# Patient Record
Sex: MALE | Race: WHITE | NOT HISPANIC OR LATINO | Employment: OTHER | URBAN - METROPOLITAN AREA
[De-identification: names, ages, dates, MRNs, and addresses within clinical notes are randomized per-mention and may not be internally consistent; named-entity substitution may affect disease eponyms.]

---

## 2021-03-22 ENCOUNTER — OFFICE VISIT (OUTPATIENT)
Dept: URGENT CARE | Facility: PHYSICIAN GROUP | Age: 66
End: 2021-03-22
Payer: MEDICARE

## 2021-03-22 VITALS
WEIGHT: 270 LBS | SYSTOLIC BLOOD PRESSURE: 122 MMHG | DIASTOLIC BLOOD PRESSURE: 68 MMHG | BODY MASS INDEX: 38.65 KG/M2 | OXYGEN SATURATION: 91 % | TEMPERATURE: 98.4 F | HEART RATE: 72 BPM | HEIGHT: 70 IN

## 2021-03-22 DIAGNOSIS — H10.11 ACUTE ALLERGIC CONJUNCTIVITIS OF RIGHT EYE: ICD-10-CM

## 2021-03-22 DIAGNOSIS — H57.11 ACUTE PAIN IN RIGHT EYE: ICD-10-CM

## 2021-03-22 PROCEDURE — 99203 OFFICE O/P NEW LOW 30 MIN: CPT | Performed by: NURSE PRACTITIONER

## 2021-03-22 RX ORDER — KETOTIFEN FUMARATE 0.25 MG/ML
1 SOLUTION/ DROPS OPHTHALMIC 2 TIMES DAILY
Qty: 10 ML | Refills: 0 | Status: SHIPPED | OUTPATIENT
Start: 2021-03-22 | End: 2021-03-29

## 2021-03-22 ASSESSMENT — ENCOUNTER SYMPTOMS
DOUBLE VISION: 0
EYE DISCHARGE: 0
SINUS PAIN: 0
EYE PAIN: 1
HEADACHES: 0
COUGH: 0
PHOTOPHOBIA: 0
DIZZINESS: 0
SORE THROAT: 0
BLURRED VISION: 1
MYALGIAS: 0
CHILLS: 0
EYE REDNESS: 0
FEVER: 0
WEAKNESS: 0

## 2021-03-22 NOTE — PROGRESS NOTES
"Subjective:      Justin Gil is a 66 y.o. male who presents with Eye Strain (Rt eye X 1 day )            HPI  States experiencing acute right eye strain with change of vision with reading close. Having to focus more reading. States uses readers to focus near. Denies eyeball pain or discharge. Admits to rubbing eye recently. Unknown last eye exam by optometrist. Denies foreign material in eye.     PMH:  has no past medical history on file.  MEDS:   Current Outpatient Medications:   •  METFORMIN HCL PO, Take  by mouth., Disp: , Rfl:   •  LISINOPRIL PO, Take  by mouth., Disp: , Rfl:   •  ZOLPIDEM TARTRATE PO, Take  by mouth., Disp: , Rfl:   •  GLIMEPIRIDE PO, Take  by mouth., Disp: , Rfl:   •  buPROPion HCl (WELLBUTRIN PO), Take  by mouth., Disp: , Rfl:   •  ketotifen (ZADITOR) 0.025 % ophthalmic solution, Administer 1 Drop into the right eye 2 times a day for 7 days., Disp: 10 mL, Rfl: 0  ALLERGIES: No Known Allergies  SURGHX: History reviewed. No pertinent surgical history.  SOCHX:  reports that he has quit smoking. He has never used smokeless tobacco.  FH: Family history was reviewed, no pertinent findings to report    Review of Systems   Constitutional: Negative for chills, fever and malaise/fatigue.   HENT: Negative for congestion, ear pain, sinus pain and sore throat.    Eyes: Positive for blurred vision and pain. Negative for double vision, photophobia, discharge and redness.   Respiratory: Negative for cough.    Musculoskeletal: Negative for myalgias.   Skin: Negative for itching and rash.   Neurological: Negative for dizziness, weakness and headaches.   Endo/Heme/Allergies: Negative for environmental allergies.   All other systems reviewed and are negative.         Objective:     /68 (BP Location: Right arm, Patient Position: Sitting, BP Cuff Size: Large adult)   Pulse 72   Temp 36.9 °C (98.4 °F)   Ht 1.778 m (5' 10\")   Wt 122 kg (270 lb)   SpO2 91%   BMI 38.74 kg/m²      Physical Exam  Vitals " reviewed.   Constitutional:       General: He is awake. He is not in acute distress.     Appearance: Normal appearance. He is well-developed. He is not ill-appearing, toxic-appearing or diaphoretic.   HENT:      Head: Normocephalic.   Eyes:      General:         Right eye: No foreign body, discharge or hordeolum.      Extraocular Movements: Extraocular movements intact.      Conjunctiva/sclera:      Right eye: Right conjunctiva is injected. Chemosis present.      Pupils: Pupils are equal.      Right eye: Pupil is round, reactive and not sluggish.      Left eye: Pupil is round, reactive and not sluggish.        Comments: Mild lower right eyelid swelling with erythema to conjunctiva, no eye d/c.    Cardiovascular:      Rate and Rhythm: Normal rate.   Pulmonary:      Effort: Pulmonary effort is normal.   Musculoskeletal:         General: Normal range of motion.      Cervical back: Normal range of motion and neck supple.   Skin:     General: Skin is warm and dry.   Neurological:      General: No focal deficit present.      Mental Status: He is alert and oriented to person, place, and time.      GCS: GCS eye subscore is 4. GCS verbal subscore is 5. GCS motor subscore is 6.   Psychiatric:         Attention and Perception: Attention and perception normal.         Mood and Affect: Mood and affect normal.         Speech: Speech normal.         Behavior: Behavior normal. Behavior is cooperative.                 Assessment/Plan:        1. Acute pain in right eye      2. Acute allergic conjunctivitis of right eye    - ketotifen (ZADITOR) 0.025 % ophthalmic solution; Administer 1 Drop into the right eye 2 times a day for 7 days.  Dispense: 10 mL; Refill: 0    May need stronger readers for near sightedness change  May use longer acting antihistamine x 7 days for any eye itchiness  May use cool compresses for any eye swelling  Avoid touching eyes  May clean eyes with mild dilute soap along eyelash line with eyes closed, rinse with  plenty of water  May use saline eye rinse or eye irrigation solution prn for eye dryness  Monitor for increase in redness or swelling, vision change, eye pain- need re-evaluation  Recommend UTD eye exam with optometrist for possible vision change due to eye strain

## 2021-09-14 ENCOUNTER — OFFICE VISIT (OUTPATIENT)
Dept: URGENT CARE | Facility: PHYSICIAN GROUP | Age: 66
End: 2021-09-14
Payer: MEDICARE

## 2021-09-14 ENCOUNTER — APPOINTMENT (OUTPATIENT)
Dept: RADIOLOGY | Facility: IMAGING CENTER | Age: 66
End: 2021-09-14
Attending: FAMILY MEDICINE
Payer: MEDICARE

## 2021-09-14 VITALS
WEIGHT: 267 LBS | OXYGEN SATURATION: 88 % | DIASTOLIC BLOOD PRESSURE: 82 MMHG | HEART RATE: 88 BPM | HEIGHT: 70 IN | SYSTOLIC BLOOD PRESSURE: 118 MMHG | RESPIRATION RATE: 18 BRPM | BODY MASS INDEX: 38.22 KG/M2 | TEMPERATURE: 98.7 F

## 2021-09-14 DIAGNOSIS — F17.200 SMOKER: ICD-10-CM

## 2021-09-14 DIAGNOSIS — M25.532 LEFT WRIST PAIN: ICD-10-CM

## 2021-09-14 DIAGNOSIS — Z86.39 HISTORY OF DIABETES MELLITUS: ICD-10-CM

## 2021-09-14 DIAGNOSIS — R09.02 HYPOXIA: ICD-10-CM

## 2021-09-14 LAB
HBA1C MFR BLD: 10.8 % (ref 0–5.6)
INT CON NEG: ABNORMAL
INT CON POS: ABNORMAL

## 2021-09-14 PROCEDURE — 99213 OFFICE O/P EST LOW 20 MIN: CPT | Performed by: FAMILY MEDICINE

## 2021-09-14 PROCEDURE — 71046 X-RAY EXAM CHEST 2 VIEWS: CPT | Mod: TC | Performed by: FAMILY MEDICINE

## 2021-09-14 PROCEDURE — 73110 X-RAY EXAM OF WRIST: CPT | Mod: TC,LT | Performed by: FAMILY MEDICINE

## 2021-09-14 PROCEDURE — 83036 HEMOGLOBIN GLYCOSYLATED A1C: CPT | Performed by: FAMILY MEDICINE

## 2021-09-14 RX ORDER — PREDNISONE 10 MG/1
30 TABLET ORAL EVERY MORNING
Qty: 15 TABLET | Refills: 0 | Status: SHIPPED | OUTPATIENT
Start: 2021-09-14 | End: 2021-09-19

## 2021-09-15 NOTE — PROGRESS NOTES
"Subjective     Justin Gil is a 66 y.o. male who presents with Wrist Pain (left wrist injury. not sure what he did. he has a lump that has been there for years but the pain is worse since yesterday. )    - This is a pleasant and nontoxic appearing 66 y.o. male with c/o Lt wrist pain for \"awhile\" worse yesterday w/ some swelling. No specific injury or trauma.     - Smoker for years, no NVFC, no CP or SOB. Chart review shows he has had lower pulse ox in past. He is feeling well at moment and denies RODGERS or orthopnea. Has only been doing 'Virtual Visits\" w/ PCP for past year or so.       ALLERGIES:  Patient has no known allergies.     PMH:  History reviewed. No pertinent past medical history.     PSH:  History reviewed. No pertinent surgical history.    MEDS:    Current Outpatient Medications:   •  predniSONE (DELTASONE) 10 MG Tab, Take 3 Tablets by mouth every morning for 5 days., Disp: 15 Tablet, Rfl: 0  •  METFORMIN HCL PO, Take  by mouth., Disp: , Rfl:   •  LISINOPRIL PO, Take  by mouth., Disp: , Rfl:   •  ZOLPIDEM TARTRATE PO, Take  by mouth., Disp: , Rfl:   •  GLIMEPIRIDE PO, Take  by mouth., Disp: , Rfl:   •  buPROPion HCl (WELLBUTRIN PO), Take  by mouth., Disp: , Rfl:     ** I have documented what I find to be significant in regards to past medical, social, family and surgical history  in my HPI or under PMH/PSH/FH review section, otherwise it is noncontributory **             HPI    Review of Systems   Musculoskeletal: Positive for joint pain.   All other systems reviewed and are negative.             Objective     /82   Pulse 88   Temp 37.1 °C (98.7 °F) (Temporal)   Resp 18   Ht 1.778 m (5' 10\")   Wt 121 kg (267 lb)   SpO2 88%   BMI 38.31 kg/m²      Physical Exam  Vitals and nursing note reviewed.   Constitutional:       General: He is not in acute distress.     Appearance: Normal appearance. He is well-developed.   HENT:      Head: Normocephalic and atraumatic.   Eyes:      General: No scleral " icterus.  Cardiovascular:      Heart sounds: Normal heart sounds. No murmur heard.     Pulmonary:      Effort: Pulmonary effort is normal. No respiratory distress.      Breath sounds: Normal breath sounds.   Skin:     Coloration: Skin is not jaundiced or pale.   Neurological:      Mental Status: He is alert.      Motor: No abnormal muscle tone.   Psychiatric:         Mood and Affect: Mood normal.         Behavior: Behavior normal.              Assessment & Plan          1. Left wrist pain  DX-WRIST-COMPLETE 3+ LEFT    AMB REFERRAL TO ESTABLISH WITH [facility]    REFERRAL TO SPORTS MEDICINE    predniSONE (DELTASONE) 10 MG Tab   2. History of diabetes mellitus  POCT  A1C    AMB REFERRAL TO ESTABLISH WITH [facility]   3. Smoker  DX-CHEST-2 VIEWS    AMB REFERRAL TO ESTABLISH WITH [facility]    REFERRAL TO PULMONARY AND SLEEP MEDICINE   4. Hypoxia  REFERRAL TO PULMONARY AND SLEEP MEDICINE       - Dx, plan & d/c instructions discussed   - Rest, stay hydrated, OTC Motrin and/or Tylenol as needed  - E.R. precautions discussed     Asked to kindly follow up with their PCP's office in 2-3 days for a recheck, ER if not improving or feeling/getting worse.    Any realistic side effects of medications that may have been given today reviewed.     Patient left in stable condition     POCT results reviewed/discussed    Radiology imaging readings reviewed/discussed

## 2021-09-16 ENCOUNTER — OFFICE VISIT (OUTPATIENT)
Dept: MEDICAL GROUP | Facility: PHYSICIAN GROUP | Age: 66
End: 2021-09-16
Payer: MEDICARE

## 2021-09-16 VITALS
TEMPERATURE: 97.4 F | HEART RATE: 84 BPM | SYSTOLIC BLOOD PRESSURE: 136 MMHG | OXYGEN SATURATION: 91 % | BODY MASS INDEX: 38.51 KG/M2 | DIASTOLIC BLOOD PRESSURE: 78 MMHG | HEIGHT: 70 IN | WEIGHT: 269 LBS

## 2021-09-16 DIAGNOSIS — E11.59 HYPERTENSION ASSOCIATED WITH TYPE 2 DIABETES MELLITUS (HCC): ICD-10-CM

## 2021-09-16 DIAGNOSIS — M19.032 ARTHRITIS OF LEFT WRIST: ICD-10-CM

## 2021-09-16 DIAGNOSIS — E11.69 DYSLIPIDEMIA ASSOCIATED WITH TYPE 2 DIABETES MELLITUS (HCC): ICD-10-CM

## 2021-09-16 DIAGNOSIS — R05.9 COUGH: ICD-10-CM

## 2021-09-16 DIAGNOSIS — E11.65 TYPE 2 DIABETES MELLITUS WITH HYPERGLYCEMIA, WITH LONG-TERM CURRENT USE OF INSULIN (HCC): ICD-10-CM

## 2021-09-16 DIAGNOSIS — Z11.59 NEED FOR HEPATITIS C SCREENING TEST: ICD-10-CM

## 2021-09-16 DIAGNOSIS — Z12.11 ENCOUNTER FOR SCREENING FOR MALIGNANT NEOPLASM OF COLON: ICD-10-CM

## 2021-09-16 DIAGNOSIS — R06.02 SHORTNESS OF BREATH: ICD-10-CM

## 2021-09-16 DIAGNOSIS — I15.2 HYPERTENSION ASSOCIATED WITH TYPE 2 DIABETES MELLITUS (HCC): ICD-10-CM

## 2021-09-16 DIAGNOSIS — Z79.4 TYPE 2 DIABETES MELLITUS WITH HYPERGLYCEMIA, WITH LONG-TERM CURRENT USE OF INSULIN (HCC): ICD-10-CM

## 2021-09-16 DIAGNOSIS — Z87.891 FORMER CIGARETTE SMOKER: ICD-10-CM

## 2021-09-16 DIAGNOSIS — Z63.4 GRIEF AT LOSS OF CHILD: ICD-10-CM

## 2021-09-16 DIAGNOSIS — F17.290 VAPING NICOTINE DEPENDENCE, TOBACCO PRODUCT: ICD-10-CM

## 2021-09-16 DIAGNOSIS — E11.42 DIABETIC POLYNEUROPATHY ASSOCIATED WITH TYPE 2 DIABETES MELLITUS (HCC): ICD-10-CM

## 2021-09-16 DIAGNOSIS — E78.5 DYSLIPIDEMIA ASSOCIATED WITH TYPE 2 DIABETES MELLITUS (HCC): ICD-10-CM

## 2021-09-16 DIAGNOSIS — F43.21 GRIEF AT LOSS OF CHILD: ICD-10-CM

## 2021-09-16 PROBLEM — J40 BRONCHITIS: Status: ACTIVE | Noted: 2017-03-01

## 2021-09-16 PROCEDURE — 99214 OFFICE O/P EST MOD 30 MIN: CPT | Performed by: STUDENT IN AN ORGANIZED HEALTH CARE EDUCATION/TRAINING PROGRAM

## 2021-09-16 RX ORDER — GABAPENTIN 800 MG/1
800 TABLET ORAL 3 TIMES DAILY
COMMUNITY
End: 2022-12-16 | Stop reason: SDUPTHER

## 2021-09-16 RX ORDER — GABAPENTIN 400 MG/1
400 CAPSULE ORAL 3 TIMES DAILY
COMMUNITY
End: 2022-10-24

## 2021-09-16 RX ORDER — BUPROPION HYDROCHLORIDE 150 MG/1
150 TABLET ORAL DAILY
COMMUNITY
End: 2022-10-24

## 2021-09-16 RX ORDER — BUPROPION HYDROCHLORIDE 300 MG/1
300 TABLET ORAL DAILY
COMMUNITY
Start: 2021-09-10 | End: 2023-05-19

## 2021-09-16 RX ORDER — INSULIN GLARGINE 100 [IU]/ML
70 INJECTION, SOLUTION SUBCUTANEOUS EVERY EVENING
COMMUNITY
End: 2022-08-02 | Stop reason: SDUPTHER

## 2021-09-16 RX ORDER — ZOLPIDEM TARTRATE 10 MG/1
10 TABLET ORAL
COMMUNITY
Start: 2021-09-10 | End: 2022-02-17

## 2021-09-16 SDOH — SOCIAL STABILITY - SOCIAL INSECURITY: DISSAPEARANCE AND DEATH OF FAMILY MEMBER: Z63.4

## 2021-09-16 ASSESSMENT — PATIENT HEALTH QUESTIONNAIRE - PHQ9
CLINICAL INTERPRETATION OF PHQ2 SCORE: 6
SUM OF ALL RESPONSES TO PHQ QUESTIONS 1-9: 13
5. POOR APPETITE OR OVEREATING: 1 - SEVERAL DAYS

## 2021-09-16 NOTE — PATIENT INSTRUCTIONS
Ideal fasting glucose <130 and random <180 (2 hours after eating). Check twice a day. Can increase lantus by 2 units every 3rd day, can adjust down as well.    Voltaren gel is ok to try for the wrist.    Semaglutide injection solution  What is this medicine?  SEMAGLUTIDE (Margaret a GLOO tide) is used to improve blood sugar control in adults with type 2 diabetes. This medicine may be used with other diabetes medicines. This drug may also reduce the risk of heart attack or stroke if you have type 2 diabetes and risk factors for heart disease.  This medicine may be used for other purposes; ask your health care provider or pharmacist if you have questions.  COMMON BRAND NAME(S): OZEMPIC  What should I tell my health care provider before I take this medicine?  They need to know if you have any of these conditions:  · endocrine tumors (MEN 2) or if someone in your family had these tumors  · eye disease, vision problems  · history of pancreatitis  · kidney disease  · stomach problems  · thyroid cancer or if someone in your family had thyroid cancer  · an unusual or allergic reaction to semaglutide, other medicines, foods, dyes, or preservatives  · pregnant or trying to get pregnant  · breast-feeding  How should I use this medicine?  This medicine is for injection under the skin of your upper leg (thigh), stomach area, or upper arm. It is given once every week (every 7 days). You will be taught how to prepare and give this medicine. Use exactly as directed. Take your medicine at regular intervals. Do not take it more often than directed.  If you use this medicine with insulin, you should inject this medicine and the insulin separately. Do not mix them together. Do not give the injections right next to each other. Change (rotate) injection sites with each injection.  It is important that you put your used needles and syringes in a special sharps container. Do not put them in a trash can. If you do not have a sharps container,  call your pharmacist or healthcare provider to get one.  A special MedGuide will be given to you by the pharmacist with each prescription and refill. Be sure to read this information carefully each time.  Talk to your pediatrician regarding the use of this medicine in children. Special care may be needed.  Overdosage: If you think you have taken too much of this medicine contact a poison control center or emergency room at once.  NOTE: This medicine is only for you. Do not share this medicine with others.  What if I miss a dose?  If you miss a dose, take it as soon as you can within 5 days after the missed dose. Then take your next dose at your regular weekly time. If it has been longer than 5 days after the missed dose, do not take the missed dose. Take the next dose at your regular time. Do not take double or extra doses. If you have questions about a missed dose, contact your health care provider for advice.  What may interact with this medicine?  · other medicines for diabetes  Many medications may cause changes in blood sugar, these include:  · alcohol containing beverages  · antiviral medicines for HIV or AIDS  · aspirin and aspirin-like drugs  · certain medicines for blood pressure, heart disease, irregular heart beat  · chromium  · diuretics  · female hormones, such as estrogens or progestins, birth control pills  · fenofibrate  · gemfibrozil  · isoniazid  · lanreotide  · male hormones or anabolic steroids  · MAOIs like Carbex, Eldepryl, Marplan, Nardil, and Parnate  · medicines for weight loss  · medicines for allergies, asthma, cold, or cough  · medicines for depression, anxiety, or psychotic disturbances  · niacin  · nicotine  · NSAIDs, medicines for pain and inflammation, like ibuprofen or naproxen  · octreotide  · pasireotide  · pentamidine  · phenytoin  · probenecid  · quinolone antibiotics such as ciprofloxacin, levofloxacin, ofloxacin  · some herbal dietary supplements  · steroid medicines such as  prednisone or cortisone  · sulfamethoxazole; trimethoprim  · thyroid hormones  Some medications can hide the warning symptoms of low blood sugar (hypoglycemia). You may need to monitor your blood sugar more closely if you are taking one of these medications. These include:  · beta-blockers, often used for high blood pressure or heart problems (examples include atenolol, metoprolol, propranolol)  · clonidine  · guanethidine  · reserpine  This list may not describe all possible interactions. Give your health care provider a list of all the medicines, herbs, non-prescription drugs, or dietary supplements you use. Also tell them if you smoke, drink alcohol, or use illegal drugs. Some items may interact with your medicine.  What should I watch for while using this medicine?  Visit your doctor or health care professional for regular checks on your progress.  Drink plenty of fluids while taking this medicine. Check with your doctor or health care professional if you get an attack of severe diarrhea, nausea, and vomiting. The loss of too much body fluid can make it dangerous for you to take this medicine.  A test called the HbA1C (A1C) will be monitored. This is a simple blood test. It measures your blood sugar control over the last 2 to 3 months. You will receive this test every 3 to 6 months.  Learn how to check your blood sugar. Learn the symptoms of low and high blood sugar and how to manage them.  Always carry a quick-source of sugar with you in case you have symptoms of low blood sugar. Examples include hard sugar candy or glucose tablets. Make sure others know that you can choke if you eat or drink when you develop serious symptoms of low blood sugar, such as seizures or unconsciousness. They must get medical help at once.  Tell your doctor or health care professional if you have high blood sugar. You might need to change the dose of your medicine. If you are sick or exercising more than usual, you might need to  change the dose of your medicine.  Do not skip meals. Ask your doctor or health care professional if you should avoid alcohol. Many nonprescription cough and cold products contain sugar or alcohol. These can affect blood sugar.  Pens should never be shared. Even if the needle is changed, sharing may result in passing of viruses like hepatitis or HIV.  Wear a medical ID bracelet or chain, and carry a card that describes your disease and details of your medicine and dosage times.  Do not become pregnant while taking this medicine. Women should inform their doctor if they wish to become pregnant or think they might be pregnant. There is a potential for serious side effects to an unborn child. Talk to your health care professional or pharmacist for more information.  What side effects may I notice from receiving this medicine?  Side effects that you should report to your doctor or health care professional as soon as possible:  · allergic reactions like skin rash, itching or hives, swelling of the face, lips, or tongue  · breathing problems  · changes in vision  · diarrhea that continues or is severe  · lump or swelling on the neck  · severe nausea  · signs and symptoms of infection like fever or chills; cough; sore throat; pain or trouble passing urine  · signs and symptoms of low blood sugar such as feeling anxious, confusion, dizziness, increased hunger, unusually weak or tired, sweating, shakiness, cold, irritable, headache, blurred vision, fast heartbeat, loss of consciousness  · signs and symptoms of kidney injury like trouble passing urine or change in the amount of urine  · trouble swallowing  · unusual stomach upset or pain  · vomiting  Side effects that usually do not require medical attention (report to your doctor or health care professional if they continue or are bothersome):  · constipation  · diarrhea  · nausea  · pain, redness, or irritation at site where injected  · stomach upset  This list may not  describe all possible side effects. Call your doctor for medical advice about side effects. You may report side effects to FDA at 6-196-LYD-6700.  Where should I keep my medicine?  Keep out of the reach of children.  Store unopened pens in a refrigerator between 2 and 8 degrees C (36 and 46 degrees F). Do not freeze. Protect from light and heat. After you first use the pen, it can be stored for 56 days at room temperature between 15 and 30 degrees C (59 and 86 degrees F) or in a refrigerator. Throw away your used pen after 56 days or after the expiration date, whichever comes first.  Do not store your pen with the needle attached. If the needle is left on, medicine may leak from the pen.  NOTE: This sheet is a summary. It may not cover all possible information. If you have questions about this medicine, talk to your doctor, pharmacist, or health care provider.  © 2020 [employer] (2020-01-17 14:25:32)

## 2021-09-16 NOTE — PROGRESS NOTES
Subjective:     CC: Establish care    HISTORY OF THE PRESENT ILLNESS: Patient is a 66 y.o. male. This pleasant patient is here today to establish care and discuss wrist pain. His/her prior PCP was Dr. Pendleton.    Patient reports he recently went to the urgent care for left wrist pain with swelling, was seen on the 14th.  Provided with prednisone which seems to be helping the pain as well as the edema.  Denies any fever or chills.  Was given a referral to sports medicine.    Grief at loss of child  Hx of bipolar per patient prior. Son killed himself 3 months ago (drug addict).     Type 2 diabetes mellitus with hyperglycemia, with long-term current use of insulin ([facility])  This is a chronic condition.  He denies any unintended weight loss, polydipsia or polyuria.  Reports chronic numbness and tingling in his feet bilaterally.  Current medications: Lantus 64 units nightly, Metformin 1000 mg twice daily, glimepiride 2 mg daily.  Last A1c: Checked during urgent care visit 10.8 9/20121  Last Microalb/Cr ratio: Due  Fasting sugars: Not checking  Last diabetic foot exam: Plan for follow-up  Last retinal eye exam: Due      Hypertension associated with type 2 diabetes mellitus (HCC)  This is a chronic condition.  Patient taking lisinopril 5 mg daily.  Not checking blood pressure at home.    Shortness of breath  Patient has been reporting shortness of breath when it was prior just with some exertion as well as acute on chronic cough.  Denies any chest pain.  No hemoptysis.  Was a prior cigarette smoker, switch that out when he quit for vaping which she understands he should likely discontinue. Quit 7 years ago, smoked for 30 years 2ppd.    Current Outpatient Medications Ordered in Epic   Medication Sig Dispense Refill   • sertraline (ZOLOFT) 50 MG Tab TAKE 2 TABLETS(100 MG) BY MOUTH DAILY     • buPROPion (WELLBUTRIN XL) 300 MG XL tablet Take 300 mg by mouth every day.     • zolpidem (AMBIEN) 10 MG Tab Take 10 mg by mouth.     •  "gabapentin (NEURONTIN) 800 MG tablet Take 800 mg by mouth 3 times a day.     • gabapentin (NEURONTIN) 400 MG Cap Take 400 mg by mouth 3 times a day.     • buPROPion (WELLBUTRIN XL) 150 MG XL tablet Take 150 mg by mouth every day.     • insulin glargine (LANTUS SOLOSTAR) 100 UNIT/ML Solution Pen-injector injection Inject 64 Units under the skin every evening.     • Semaglutide,0.25 or 0.5MG/DOS, 2 MG/1.5ML Solution Pen-injector Inject 0.25 mg under the skin every 7 days. 1.5 mL 1   • predniSONE (DELTASONE) 10 MG Tab Take 3 Tablets by mouth every morning for 5 days. 15 Tablet 0   • METFORMIN HCL PO Take 1,000 mg by mouth 2 (two) times a day.     • LISINOPRIL PO Take 5 mg by mouth every day.     • ZOLPIDEM TARTRATE PO Take 10 mg by mouth at bedtime as needed.     • GLIMEPIRIDE PO Take 2 mg by mouth every day.       No current [facility]-ordered facility-administered medications on file.     ROS:   See HPI      Objective:     Exam: /78 (BP Location: Left arm, Patient Position: Sitting, BP Cuff Size: Adult long)   Pulse 84   Temp 36.3 °C (97.4 °F) (Temporal)   Ht 1.778 m (5' 10\")   Wt 122 kg (269 lb)   SpO2 91%  Body mass index is 38.6 kg/m².    General: Well developed, well nourished in no acute distress.  Head: Normocephalic and atraumatic.  Eyes: Conjunctivae and extraocular motions are normal. Pupils are equal, round. No scleral icterus.   Ears:  External ears unremarkable.  Nose: Nares patent. No discharge.  Mouth/Throat: Oropharynx is clear and moist. Posterior pharynx without erythema or exudates.  Neck: Supple. Thyroid is not enlarged.  Pulmonary: Clear to ausculation, but slightly diminished in the bases.  Normal effort. No rales, ronchi, or wheezing.  Cardiovascular: Regular rate and rhythm without murmur.  Abdomen: Soft, nontender, nondistended. Normal bowel sounds. No HSM.  Neurologic: Normal gait.   Lymph: No cervical or supraclavicular lymph nodes are palpable  Skin: Warm and dry.  No obvious " lesions.  Musculoskeletal: No extremity cyanosis, clubbing, or edema.  Psych: Normal mood and affect. Alert and oriented x3. Judgment and insight is normal.    Labs: A1c reviewed from 9/14/2021  Imaging: Left wrist x-ray 9/14/2021  IMPRESSION:  1.  No radiographic evidence of acute fracture or dislocation.  2.  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  3.  Soft tissue swelling overlying the dorsal aspect of the left wrist.    Assessment & Plan:   66 y.o. male with the following -    1. Arthritis of left wrist  Chronic arthritis with acute pain, improved with prednisone.  Okay to try Tylenol or ibuprofen as well for pain.  Has a referral to sports medicine, otto to trial Voltaren gel for his wrist.    2. Grief at loss of child  This is an acute condition.  Patient reports he has a history of bipolar prior.  Recent depression and grief related to some suicide 3 months ago.  Would benefit from psychiatry evaluation and treatment with medication management.  Continue current dose of Wellbutrin and Zoloft, no safety concerns.  Patient seeking grief counseling, agree.  - REFERRAL TO PSYCHIATRY    3. Cough  4. Shortness of breath  5. Former cigarette smoker  6. Vaping nicotine dependence, tobacco product  Pulse ox was low at recent urgent care visit, acceptable today.  Reviewed chest x-ray.  He has a referral to pulmonary sleep medicine but we will go ahead and obtain pulmonary function test and CT chest (does not meet criteria for routine lung cancer screening at this time given symptoms).  Check CBC to ensure he does not have anemia.  Strongly encouraged him to stop vaping. Gave f/u and ED precautions.   - PULMONARY FUNCTION TESTS -Test requested: Complete Pulmonary Function Test; Future  - CT-CHEST (THORAX) W/O; Future  - CBC WITH DIFFERENTIAL; Future    7. Type 2 diabetes mellitus with hyperglycemia, with long-term current use of  insulin (HCC)  This is a chronic condition, improved from prior but still uncontrolled.  Denies any history of pancreatitis or family history of thyroid cancers, we will add Ozempic as below, trend A1c in 3 months.  Continue Metformin 1000 mg daily, glimepiride 2 mg daily, Lantus 64 units QHS-can increase by 2 units every third day to adjust to goal fasting glucose less than 130 and random less than 180.  Overdue for labs as below.  - Comp Metabolic Panel; Future  - MICROALBUMIN CREAT RATIO URINE; Future  - Semaglutide,0.25 or 0.5MG/DOS, 2 MG/1.5ML Solution Pen-injector; Inject 0.25 mg under the skin every 7 days.  Dispense: 1.5 mL; Refill: 1    8. Diabetic polyneuropathy associated with type 2 diabetes mellitus (HCC)  Chronic, will work on improving diabetes as above.  Plan for monofilament next visit.  Continue gabapentin.    9. Dyslipidemia associated with type 2 diabetes mellitus (HCC)  This is a chronic condition-not on medication, this was listed as a prior diagnosis but I do not see recent labs in the chart currently, will update.  Likely would benefit from a statin.  - Lipid Profile; Future    10. Hypertension associated with type 2 diabetes mellitus (HCC)  This is a chronic condition, overall well controlled but ideally would be best less than 130/80.  Continue lisinopril 5 mg for now.    11. Encounter for screening for malignant neoplasm of colon  - REFERRAL TO GI FOR COLONOSCOPY    12. Need for hepatitis C screening test  - HEP C VIRUS ANTIBODY; Future    Return in about 4 weeks (around 10/14/2021) for Diabetes.    Please note that this dictation was created using voice recognition software. I have made every reasonable attempt to correct obvious errors, but I expect that there are errors of grammar and possibly content that I did not discover before finalizing the note.

## 2021-09-17 PROBLEM — E11.59 HYPERTENSION ASSOCIATED WITH TYPE 2 DIABETES MELLITUS (HCC): Status: RESOLVED | Noted: 2021-09-17 | Resolved: 2021-09-17

## 2021-09-17 PROBLEM — E11.59 HYPERTENSION ASSOCIATED WITH TYPE 2 DIABETES MELLITUS (HCC): Status: ACTIVE | Noted: 2021-09-17

## 2021-09-17 PROBLEM — I15.2 HYPERTENSION ASSOCIATED WITH TYPE 2 DIABETES MELLITUS (HCC): Status: RESOLVED | Noted: 2021-09-17 | Resolved: 2021-09-17

## 2021-09-17 PROBLEM — I15.2 HYPERTENSION ASSOCIATED WITH TYPE 2 DIABETES MELLITUS (HCC): Status: ACTIVE | Noted: 2021-09-17

## 2021-09-18 NOTE — ASSESSMENT & PLAN NOTE
This is a chronic condition.  Patient taking lisinopril 5 mg daily.  Not checking blood pressure at home.

## 2021-09-18 NOTE — ASSESSMENT & PLAN NOTE
This is a chronic condition.  He denies any unintended weight loss, polydipsia or polyuria.  Reports chronic numbness and tingling in his feet bilaterally.  Current medications: Lantus 64 units nightly, Metformin 1000 mg twice daily, glimepiride 2 mg daily.  Last A1c: Checked during urgent care visit 10.8 9/20121  Last Microalb/Cr ratio: Due  Fasting sugars: Not checking  Last diabetic foot exam: Plan for follow-up  Last retinal eye exam: Due

## 2021-09-18 NOTE — ASSESSMENT & PLAN NOTE
Patient has been reporting shortness of breath when it was prior just with some exertion as well as acute on chronic cough.  Denies any chest pain.  No hemoptysis.  Was a prior cigarette smoker, switch that out when he quit for vaping which she understands he should likely discontinue. Quit 7 years ago, smoked for 30 years 2ppd.

## 2021-09-20 ENCOUNTER — TELEPHONE (OUTPATIENT)
Dept: MEDICAL GROUP | Facility: PHYSICIAN GROUP | Age: 66
End: 2021-09-20

## 2021-09-20 ENCOUNTER — HOSPITAL ENCOUNTER (OUTPATIENT)
Dept: LAB | Facility: MEDICAL CENTER | Age: 66
End: 2021-09-20
Attending: STUDENT IN AN ORGANIZED HEALTH CARE EDUCATION/TRAINING PROGRAM
Payer: MEDICARE

## 2021-09-20 DIAGNOSIS — E78.5 DYSLIPIDEMIA ASSOCIATED WITH TYPE 2 DIABETES MELLITUS (HCC): ICD-10-CM

## 2021-09-20 DIAGNOSIS — E11.69 DYSLIPIDEMIA ASSOCIATED WITH TYPE 2 DIABETES MELLITUS (HCC): ICD-10-CM

## 2021-09-20 DIAGNOSIS — E11.65 TYPE 2 DIABETES MELLITUS WITH HYPERGLYCEMIA, WITH LONG-TERM CURRENT USE OF INSULIN (HCC): ICD-10-CM

## 2021-09-20 DIAGNOSIS — Z11.59 NEED FOR HEPATITIS C SCREENING TEST: ICD-10-CM

## 2021-09-20 DIAGNOSIS — R06.02 SHORTNESS OF BREATH: ICD-10-CM

## 2021-09-20 DIAGNOSIS — Z79.4 TYPE 2 DIABETES MELLITUS WITH HYPERGLYCEMIA, WITH LONG-TERM CURRENT USE OF INSULIN (HCC): ICD-10-CM

## 2021-09-20 LAB
ALBUMIN SERPL BCP-MCNC: 4 G/DL (ref 3.2–4.9)
ALBUMIN/GLOB SERPL: 1.4 G/DL
ALP SERPL-CCNC: 112 U/L (ref 30–99)
ALT SERPL-CCNC: 42 U/L (ref 2–50)
ANION GAP SERPL CALC-SCNC: 13 MMOL/L (ref 7–16)
AST SERPL-CCNC: 26 U/L (ref 12–45)
BASOPHILS # BLD AUTO: 1 % (ref 0–1.8)
BASOPHILS # BLD: 0.11 K/UL (ref 0–0.12)
BILIRUB SERPL-MCNC: 0.3 MG/DL (ref 0.1–1.5)
BUN SERPL-MCNC: 21 MG/DL (ref 8–22)
CALCIUM SERPL-MCNC: 9.2 MG/DL (ref 8.5–10.5)
CHLORIDE SERPL-SCNC: 99 MMOL/L (ref 96–112)
CHOLEST SERPL-MCNC: 226 MG/DL (ref 100–199)
CO2 SERPL-SCNC: 22 MMOL/L (ref 20–33)
CREAT SERPL-MCNC: 0.81 MG/DL (ref 0.5–1.4)
EOSINOPHIL # BLD AUTO: 0.19 K/UL (ref 0–0.51)
EOSINOPHIL NFR BLD: 1.7 % (ref 0–6.9)
ERYTHROCYTE [DISTWIDTH] IN BLOOD BY AUTOMATED COUNT: 47.1 FL (ref 35.9–50)
GLOBULIN SER CALC-MCNC: 2.9 G/DL (ref 1.9–3.5)
GLUCOSE SERPL-MCNC: 233 MG/DL (ref 65–99)
HCT VFR BLD AUTO: 45.1 % (ref 42–52)
HCV AB SER QL: NORMAL
HDLC SERPL-MCNC: 40 MG/DL
HGB BLD-MCNC: 15 G/DL (ref 14–18)
IMM GRANULOCYTES # BLD AUTO: 0.08 K/UL (ref 0–0.11)
IMM GRANULOCYTES NFR BLD AUTO: 0.7 % (ref 0–0.9)
LDLC SERPL CALC-MCNC: ABNORMAL MG/DL
LYMPHOCYTES # BLD AUTO: 3.25 K/UL (ref 1–4.8)
LYMPHOCYTES NFR BLD: 28.5 % (ref 22–41)
MCH RBC QN AUTO: 30.4 PG (ref 27–33)
MCHC RBC AUTO-ENTMCNC: 33.3 G/DL (ref 33.7–35.3)
MCV RBC AUTO: 91.3 FL (ref 81.4–97.8)
MONOCYTES # BLD AUTO: 1 K/UL (ref 0–0.85)
MONOCYTES NFR BLD AUTO: 8.8 % (ref 0–13.4)
NEUTROPHILS # BLD AUTO: 6.77 K/UL (ref 1.82–7.42)
NEUTROPHILS NFR BLD: 59.3 % (ref 44–72)
NRBC # BLD AUTO: 0 K/UL
NRBC BLD-RTO: 0 /100 WBC
PLATELET # BLD AUTO: 239 K/UL (ref 164–446)
PMV BLD AUTO: 9.3 FL (ref 9–12.9)
POTASSIUM SERPL-SCNC: 3.8 MMOL/L (ref 3.6–5.5)
PROT SERPL-MCNC: 6.9 G/DL (ref 6–8.2)
RBC # BLD AUTO: 4.94 M/UL (ref 4.7–6.1)
SODIUM SERPL-SCNC: 134 MMOL/L (ref 135–145)
TRIGL SERPL-MCNC: 637 MG/DL (ref 0–149)
WBC # BLD AUTO: 11.4 K/UL (ref 4.8–10.8)

## 2021-09-20 PROCEDURE — 36415 COLL VENOUS BLD VENIPUNCTURE: CPT

## 2021-09-20 PROCEDURE — 80061 LIPID PANEL: CPT

## 2021-09-20 PROCEDURE — 80053 COMPREHEN METABOLIC PANEL: CPT

## 2021-09-20 PROCEDURE — 86803 HEPATITIS C AB TEST: CPT

## 2021-09-20 PROCEDURE — 85025 COMPLETE CBC W/AUTO DIFF WBC: CPT

## 2021-09-20 NOTE — TELEPHONE ENCOUNTER
Pt called and left VM stating that he can not afford to  the Ozempic as it is over $400. Can a alternative medication be sent to pharmacy?

## 2021-09-21 ENCOUNTER — HOSPITAL ENCOUNTER (OUTPATIENT)
Facility: MEDICAL CENTER | Age: 66
End: 2021-09-21
Attending: STUDENT IN AN ORGANIZED HEALTH CARE EDUCATION/TRAINING PROGRAM
Payer: MEDICARE

## 2021-09-21 ENCOUNTER — NON-PROVIDER VISIT (OUTPATIENT)
Dept: SLEEP MEDICINE | Facility: MEDICAL CENTER | Age: 66
End: 2021-09-21
Attending: STUDENT IN AN ORGANIZED HEALTH CARE EDUCATION/TRAINING PROGRAM
Payer: MEDICARE

## 2021-09-21 VITALS — WEIGHT: 264 LBS | HEIGHT: 67 IN | BODY MASS INDEX: 41.44 KG/M2

## 2021-09-21 DIAGNOSIS — F17.290 VAPING NICOTINE DEPENDENCE, TOBACCO PRODUCT: ICD-10-CM

## 2021-09-21 DIAGNOSIS — R05.9 COUGH: ICD-10-CM

## 2021-09-21 DIAGNOSIS — Z87.891 FORMER CIGARETTE SMOKER: ICD-10-CM

## 2021-09-21 DIAGNOSIS — R06.02 SHORTNESS OF BREATH: ICD-10-CM

## 2021-09-21 LAB
CREAT UR-MCNC: 45.6 MG/DL
MICROALBUMIN UR-MCNC: <1.2 MG/DL
MICROALBUMIN/CREAT UR: NORMAL MG/G (ref 0–30)

## 2021-09-21 PROCEDURE — 82043 UR ALBUMIN QUANTITATIVE: CPT

## 2021-09-21 PROCEDURE — 94729 DIFFUSING CAPACITY: CPT | Performed by: INTERNAL MEDICINE

## 2021-09-21 PROCEDURE — 82570 ASSAY OF URINE CREATININE: CPT

## 2021-09-21 PROCEDURE — 94060 EVALUATION OF WHEEZING: CPT | Performed by: INTERNAL MEDICINE

## 2021-09-21 PROCEDURE — 94726 PLETHYSMOGRAPHY LUNG VOLUMES: CPT | Performed by: INTERNAL MEDICINE

## 2021-09-21 ASSESSMENT — PULMONARY FUNCTION TESTS
FEV1/FVC: 77.13
FEV1: 2.26
FVC_PREDICTED: 3.96
FVC_LLN: 3.31
FVC_PERCENT_PREDICTED: 72
FEV1/FVC_PERCENT_PREDICTED: 77
FEV1/FVC: 74
FVC_PERCENT_PREDICTED: 73
FEV1_PERCENT_PREDICTED: 74
FVC: 2.93
FEV1/FVC_PERCENT_CHANGE: 4
FEV1/FVC: 77
FVC: 2.88
FEV1/FVC_PERCENT_PREDICTED: 101
FEV1_PREDICTED: 3.05
FEV1/FVC_PREDICTED: 77
FEV1/FVC_PERCENT_PREDICTED: 95
FEV1/FVC_PERCENT_PREDICTED: 100
FEV1/FVC_PERCENT_CHANGE: 600
FEV1/FVC: 74
FEV1/FVC_PERCENT_LLN: 64
FEV1_LLN: 2.55
FEV1_PERCENT_CHANGE: 6
FEV1: 2.12
FEV1_PERCENT_PREDICTED: 69
FEV1_PERCENT_CHANGE: 1
FEV1/FVC_PERCENT_PREDICTED: 96

## 2021-09-21 ASSESSMENT — FIBROSIS 4 INDEX: FIB4 SCORE: 1.11

## 2021-11-30 ENCOUNTER — HOSPITAL ENCOUNTER (OUTPATIENT)
Facility: MEDICAL CENTER | Age: 66
End: 2021-11-30
Attending: FAMILY MEDICINE
Payer: MEDICARE

## 2021-11-30 ENCOUNTER — OFFICE VISIT (OUTPATIENT)
Dept: URGENT CARE | Facility: PHYSICIAN GROUP | Age: 66
End: 2021-11-30
Payer: MEDICARE

## 2021-11-30 VITALS
DIASTOLIC BLOOD PRESSURE: 56 MMHG | HEART RATE: 77 BPM | SYSTOLIC BLOOD PRESSURE: 124 MMHG | WEIGHT: 260 LBS | HEIGHT: 67 IN | BODY MASS INDEX: 40.81 KG/M2 | TEMPERATURE: 98.3 F | OXYGEN SATURATION: 92 % | RESPIRATION RATE: 20 BRPM

## 2021-11-30 DIAGNOSIS — J32.9 VIRAL SINUSITIS: ICD-10-CM

## 2021-11-30 DIAGNOSIS — B97.89 VIRAL SINUSITIS: ICD-10-CM

## 2021-11-30 PROCEDURE — U0005 INFEC AGEN DETEC AMPLI PROBE: HCPCS

## 2021-11-30 PROCEDURE — U0003 INFECTIOUS AGENT DETECTION BY NUCLEIC ACID (DNA OR RNA); SEVERE ACUTE RESPIRATORY SYNDROME CORONAVIRUS 2 (SARS-COV-2) (CORONAVIRUS DISEASE [COVID-19]), AMPLIFIED PROBE TECHNIQUE, MAKING USE OF HIGH THROUGHPUT TECHNOLOGIES AS DESCRIBED BY CMS-2020-01-R: HCPCS

## 2021-11-30 PROCEDURE — 99213 OFFICE O/P EST LOW 20 MIN: CPT | Performed by: FAMILY MEDICINE

## 2021-11-30 RX ORDER — FLUTICASONE PROPIONATE 50 MCG
1 SPRAY, SUSPENSION (ML) NASAL 2 TIMES DAILY
Qty: 16 G | Refills: 0 | Status: SHIPPED | OUTPATIENT
Start: 2021-11-30 | End: 2021-12-07

## 2021-11-30 RX ORDER — LISINOPRIL 5 MG/1
5 TABLET ORAL DAILY
COMMUNITY
Start: 2021-10-02 | End: 2022-04-29 | Stop reason: SDUPTHER

## 2021-11-30 RX ORDER — LORATADINE 10 MG
2 CAPSULE ORAL 2 TIMES DAILY
Qty: 1.2 ML | Refills: 0 | Status: SHIPPED | OUTPATIENT
Start: 2021-11-30 | End: 2021-12-03

## 2021-11-30 ASSESSMENT — FIBROSIS 4 INDEX: FIB4 SCORE: 1.11

## 2021-12-01 DIAGNOSIS — J32.9 VIRAL SINUSITIS: ICD-10-CM

## 2021-12-01 DIAGNOSIS — B97.89 VIRAL SINUSITIS: ICD-10-CM

## 2021-12-01 LAB — COVID ORDER STATUS COVID19: NORMAL

## 2021-12-01 NOTE — PROGRESS NOTES
CC:  cough        Cough  This is a new problem. The current episode started 2 days ago. The problem has been unchanged. The problem occurs constantly. The cough is productive. Associated symptoms include : sinus pain, nasal congestion.    He denies:  muscle aches, fever. Pertinent negatives include no      nausea, vomiting, diarrhea, sweats, weight loss or wheezing. Nothing aggravates the symptoms.  Patient has tried nothing for the symptoms. There is no history of asthma.        Past Medical History:   Diagnosis Date   • PNA (pneumonia)     with septic shock per patient         Social History     Tobacco Use   • Smoking status: Former Smoker     Packs/day: 2.00     Years: 60.00     Pack years: 120.00     Types: Cigarettes     Quit date: 2014     Years since quittin.2   • Smokeless tobacco: Never Used   Vaping Use   • Vaping Use: Every day   • Substances: Nicotine   Substance Use Topics   • Alcohol use: Never   • Drug use: Never         Current Outpatient Medications on File Prior to Visit   Medication Sig Dispense Refill   • sertraline (ZOLOFT) 50 MG Tab TAKE 2 TABLETS(100 MG) BY MOUTH DAILY     • buPROPion (WELLBUTRIN XL) 300 MG XL tablet Take 300 mg by mouth every day.     • zolpidem (AMBIEN) 10 MG Tab Take 10 mg by mouth.     • gabapentin (NEURONTIN) 800 MG tablet Take 800 mg by mouth 3 times a day.     • gabapentin (NEURONTIN) 400 MG Cap Take 400 mg by mouth 3 times a day.     • buPROPion (WELLBUTRIN XL) 150 MG XL tablet Take 150 mg by mouth every day.     • insulin glargine (LANTUS SOLOSTAR) 100 UNIT/ML Solution Pen-injector injection Inject 64 Units under the skin every evening.     • Semaglutide,0.25 or 0.5MG/DOS, 2 MG/1.5ML Solution Pen-injector Inject 0.25 mg under the skin every 7 days. 1.5 mL 1   • METFORMIN HCL PO Take 1,000 mg by mouth 2 (two) times a day.     • LISINOPRIL PO Take 5 mg by mouth every day.     • ZOLPIDEM TARTRATE PO Take 10 mg by mouth at bedtime as needed.     •  "GLIMEPIRIDE PO Take 2 mg by mouth every day.       No current facility-administered medications on file prior to visit.                    Review of Systems   Constitutional: Negative for fever and weight loss.   HENT: negative for otalgia  Cardiovascular - denies chest pain or dyspnea  Respiratory: Positive for cough.  .  Negative for wheezing.    Neurological: Negative for headaches.   GI - denies nausea, vomiting or diarrhea  Neuro - denies numbness or tingling.            Objective:     /56 (BP Location: Left arm, Patient Position: Sitting, BP Cuff Size: Adult)   Pulse 77   Temp 36.8 °C (98.3 °F) (Temporal)   Resp 20   Ht 1.702 m (5' 7\")   Wt 118 kg (260 lb)   SpO2 92%     Physical Exam   Constitutional: patient is oriented to person, place, and time. Patient appears well-developed and well-nourished. No distress.   HENT:   Head: Normocephalic and atraumatic.   Right Ear: External ear normal.   Left Ear: External ear normal.   Nose: Mucosal edema  present.  +  bilat sinus  tenderness.   Mouth/Throat: Mucous membranes are normal. No oral lesions.  No posterior pharyngeal erythema.  No oropharyngeal exudate or posterior oropharyngeal edema.   Eyes: Conjunctivae and EOM are normal. Pupils are equal, round, and reactive to light. Right eye exhibits no discharge. Left eye exhibits no discharge. No scleral icterus.   Neck: Normal range of motion. Neck supple. No tracheal deviation present.   Cardiovascular: Normal rate, regular rhythm and normal heart sounds.  Exam reveals no friction rub.    Pulmonary/Chest: Effort normal. No respiratory distress. Patient has no wheezes or rhonchi. Patient has no rales.    Musculoskeletal:  exhibits no edema.   Lymphadenopathy:     Patient has no cervical adenopathy.      Neurological: patient is alert and oriented to person, place, and time.   Skin: Skin is warm and dry. No rash noted. No erythema.   Psychiatric: patient  has a normal mood and affect.  behavior is " normal.   Nursing note and vitals reviewed.              Assessment/Plan:           1. Viral sinusitis   rx flonase  Bid    COVID screen sent       Follow up in one week if no improvement, sooner if symptoms worsen.

## 2021-12-02 LAB
SARS-COV-2 RNA RESP QL NAA+PROBE: NOTDETECTED
SPECIMEN SOURCE: NORMAL

## 2022-01-03 ENCOUNTER — OFFICE VISIT (OUTPATIENT)
Dept: URGENT CARE | Facility: PHYSICIAN GROUP | Age: 67
End: 2022-01-03
Payer: MEDICARE

## 2022-01-03 VITALS
OXYGEN SATURATION: 90 % | TEMPERATURE: 98 F | HEART RATE: 83 BPM | BODY MASS INDEX: 40.16 KG/M2 | SYSTOLIC BLOOD PRESSURE: 130 MMHG | DIASTOLIC BLOOD PRESSURE: 68 MMHG | WEIGHT: 265 LBS | HEIGHT: 68 IN | RESPIRATION RATE: 16 BRPM
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
## 2022-01-03 DIAGNOSIS — S39.92XA INJURY OF BACK, INITIAL ENCOUNTER: ICD-10-CM

## 2022-01-03 PROCEDURE — 99213 OFFICE O/P EST LOW 20 MIN: CPT | Performed by: FAMILY MEDICINE

## 2022-01-03 ASSESSMENT — FIBROSIS 4 INDEX: FIB4 SCORE: 1.12

## 2022-01-03 NOTE — PROGRESS NOTES
"Subjective     Justin Gil is a 67 y.o. male who presents with Back Pain (x3days fell on back, now having back pain )  Pain shoots down the legs.  Denies any loss of bladder bowel control.  Denies paresthesia.  No other injuries.  This happened while he was in the bathroom and slipped and fell backwards and there was a small luggage in the bathroom and hit his back on that.  No LOC.  Review of system otherwise negative        HPI    ROS           Objective     /68 (BP Location: Right arm, Patient Position: Sitting, BP Cuff Size: Adult)   Pulse 83   Temp 36.7 °C (98 °F) (Temporal)   Resp 16   Ht 1.727 m (5' 8\")   Wt 120 kg (265 lb)   SpO2 90%   BMI 40.29 kg/m²      Physical Exam  Constitutional:       General: He is not in acute distress.     Appearance: He is not ill-appearing, toxic-appearing or diaphoretic.   Eyes:      Conjunctiva/sclera: Conjunctivae normal.   Cardiovascular:      Rate and Rhythm: Normal rate.   Pulmonary:      Effort: Pulmonary effort is normal. No respiratory distress.      Breath sounds: No stridor.   Musculoskeletal:      Thoracic back: Bony tenderness present. No swelling, deformity, signs of trauma or lacerations.      Lumbar back: Bony tenderness (In the outlined area) present. No swelling, edema, deformity, signs of trauma or lacerations.        Back:    Skin:     Coloration: Skin is not jaundiced or pale.   Neurological:      Mental Status: He is alert and oriented to person, place, and time.      Comments: Grossly benign   Psychiatric:         Behavior: Behavior normal.             Assessment & Plan   ASSESSMENT:PLAN:  1. Injury of back, initial encounter    2. Fall, initial encounter    Fall and back injury and tenderness in the midline as mentioned above.  Does not have any neurologic deficit.  This happened 3 days ago.  We do not even have an x-ray but he actually needs CT.  Sent to local ED for evaluation.             "

## 2022-01-16 ENCOUNTER — OFFICE VISIT (OUTPATIENT)
Dept: URGENT CARE | Facility: PHYSICIAN GROUP | Age: 67
End: 2022-01-16
Payer: MEDICARE

## 2022-01-16 VITALS
HEART RATE: 89 BPM | OXYGEN SATURATION: 90 % | RESPIRATION RATE: 20 BRPM | SYSTOLIC BLOOD PRESSURE: 134 MMHG | WEIGHT: 271.8 LBS | BODY MASS INDEX: 42.66 KG/M2 | TEMPERATURE: 98.5 F | HEIGHT: 67 IN | DIASTOLIC BLOOD PRESSURE: 78 MMHG

## 2022-01-16 DIAGNOSIS — S39.012A STRAIN OF LUMBAR REGION, INITIAL ENCOUNTER: ICD-10-CM

## 2022-01-16 PROCEDURE — 99213 OFFICE O/P EST LOW 20 MIN: CPT | Performed by: FAMILY MEDICINE

## 2022-01-16 RX ORDER — CYCLOBENZAPRINE HCL 10 MG
5 TABLET ORAL 2 TIMES DAILY PRN
Qty: 10 TABLET | Refills: 0 | Status: SHIPPED | OUTPATIENT
Start: 2022-01-16 | End: 2022-02-17 | Stop reason: SDUPTHER

## 2022-01-16 RX ORDER — LISINOPRIL 5 MG/1
1 TABLET ORAL DAILY
COMMUNITY
Start: 2021-12-17 | End: 2022-01-16

## 2022-01-16 ASSESSMENT — FIBROSIS 4 INDEX: FIB4 SCORE: 1.12

## 2022-01-16 NOTE — PROGRESS NOTES
"  Subjective:      67 y.o. male presents to urgent care for lower back pain that started about 2 weeks ago. Prior to the pain he had a ground level fall. He did present to  after the fall, but was sent to ED for imaging which returned normal. Unfortunately he continues to have pain without any change over the last two weeks, it is located in the middle, lower back, it is intermittent, it improves with rest, it's described as sharp, currently rated 0/10. He has been using Tylenol, Ibuprofen, and Prednisone burst, all of which provide temporary relief. No numbness or weakness to lower extremities bilaterally. No loss of bowel or bladder function.     He denies any other questions or concerns at this time.    Current problem list, medication, and past medical/surgical history were reviewed in Epic.    ROS  See HPI     Objective:      /78 (BP Location: Left arm, Patient Position: Sitting, BP Cuff Size: Adult)   Pulse 89   Temp 36.9 °C (98.5 °F) (Temporal)   Resp 20   Ht 1.702 m (5' 7\")   Wt 123 kg (271 lb 12.8 oz)   SpO2 90%   BMI 42.57 kg/m²     Physical Exam  Constitutional:       General: He is not in acute distress.     Appearance: He is not diaphoretic.   Cardiovascular:      Rate and Rhythm: Normal rate and regular rhythm.      Heart sounds: Normal heart sounds.   Pulmonary:      Effort: Pulmonary effort is normal. No respiratory distress.      Breath sounds: Normal breath sounds.   Musculoskeletal:      Comments: No discolorations or deformities noted to inspection of his back.  No step-offs or areas of tenderness noted to palpation of spine.  He is tender to paraspinal region bilaterally in the lumbar area.   Neurological:      Mental Status: He is alert.      Comments: Equal strength and sensation to lower extremities bilaterally.   Psychiatric:         Mood and Affect: Affect normal.         Judgment: Judgment normal.       Assessment/Plan:     1. Strain of lumbar region, initial " encounter  Patient was given muscle relaxants to use as needed.  He was encouraged to continue with heat, Tylenol and ibuprofen as needed for symptomatic relief.  Exercise handout has been given.  - cyclobenzaprine (FLEXERIL) 10 mg Tab; Take 0.5 Tablets by mouth 2 times a day as needed for Moderate Pain or Muscle Spasms.  Dispense: 10 Tablet; Refill: 0      Instructed to return to Urgent Care or nearest Emergency Department if symptoms fail to improve, for any change in condition, further concerns, or new concerning symptoms. Patient states understanding of the plan of care and discharge instructions.    Joellen Weston M.D.

## 2022-02-09 DIAGNOSIS — M48.061 SPINAL STENOSIS OF LUMBAR REGION, UNSPECIFIED WHETHER NEUROGENIC CLAUDICATION PRESENT: ICD-10-CM

## 2022-02-09 DIAGNOSIS — M54.16 LUMBAR NERVE ROOT IMPINGEMENT: ICD-10-CM

## 2022-02-09 NOTE — PROGRESS NOTES
Received a phone call from [facility] emergency department on [facility] regarding this patient's fourth ER visit for low back pain.  He had some benefit from prednisone 3 weeks ago.  ER provider feels that he may benefit from a lumbar MRI and referral to pain management as he has had some improvement with injections in the past.  ER provider states that he has not been able to get in to be seen, I do not see that he has any upcoming appointments and was supposed to follow-up in October of last year.    ER provider reviewed CT results 1/19/2022: L45 canal steonisis and bilat nerve root impingement.  We will plan for MRI and referral to physiatry for consideration of injections as that has been helpful in the past, patient not interested in surgery.  Discussed with ER provider that patient was due to follow-up in October but has been lost to follow-up so encouraged a appointment.  Will request records from ER.

## 2022-02-17 ENCOUNTER — OFFICE VISIT (OUTPATIENT)
Dept: MEDICAL GROUP | Facility: PHYSICIAN GROUP | Age: 67
End: 2022-02-17
Payer: MEDICARE

## 2022-02-17 VITALS
TEMPERATURE: 98.9 F | SYSTOLIC BLOOD PRESSURE: 122 MMHG | WEIGHT: 273 LBS | HEART RATE: 96 BPM | OXYGEN SATURATION: 91 % | DIASTOLIC BLOOD PRESSURE: 78 MMHG | BODY MASS INDEX: 42.85 KG/M2 | HEIGHT: 67 IN

## 2022-02-17 DIAGNOSIS — L84 CALLUS OF FOOT: ICD-10-CM

## 2022-02-17 DIAGNOSIS — I15.2 HYPERTENSION ASSOCIATED WITH TYPE 2 DIABETES MELLITUS (HCC): ICD-10-CM

## 2022-02-17 DIAGNOSIS — Z79.4 TYPE 2 DIABETES MELLITUS WITH HYPERGLYCEMIA, WITH LONG-TERM CURRENT USE OF INSULIN (HCC): ICD-10-CM

## 2022-02-17 DIAGNOSIS — M54.16 LUMBAR NERVE ROOT IMPINGEMENT: ICD-10-CM

## 2022-02-17 DIAGNOSIS — Z59.9 FINANCIAL DIFFICULTY: ICD-10-CM

## 2022-02-17 DIAGNOSIS — E11.65 TYPE 2 DIABETES MELLITUS WITH HYPERGLYCEMIA, WITH LONG-TERM CURRENT USE OF INSULIN (HCC): ICD-10-CM

## 2022-02-17 DIAGNOSIS — M54.41 CHRONIC BILATERAL LOW BACK PAIN WITH BILATERAL SCIATICA: ICD-10-CM

## 2022-02-17 DIAGNOSIS — E11.59 HYPERTENSION ASSOCIATED WITH TYPE 2 DIABETES MELLITUS (HCC): ICD-10-CM

## 2022-02-17 DIAGNOSIS — M54.42 CHRONIC BILATERAL LOW BACK PAIN WITH BILATERAL SCIATICA: ICD-10-CM

## 2022-02-17 DIAGNOSIS — G89.29 CHRONIC BILATERAL LOW BACK PAIN WITH BILATERAL SCIATICA: ICD-10-CM

## 2022-02-17 DIAGNOSIS — M21.612 BILATERAL BUNIONS: ICD-10-CM

## 2022-02-17 DIAGNOSIS — M48.062 SPINAL STENOSIS OF LUMBAR REGION WITH NEUROGENIC CLAUDICATION: ICD-10-CM

## 2022-02-17 DIAGNOSIS — E11.42 DIABETIC POLYNEUROPATHY ASSOCIATED WITH TYPE 2 DIABETES MELLITUS (HCC): ICD-10-CM

## 2022-02-17 DIAGNOSIS — L60.9 NAIL ABNORMALITIES: ICD-10-CM

## 2022-02-17 DIAGNOSIS — M21.611 BILATERAL BUNIONS: ICD-10-CM

## 2022-02-17 PROBLEM — E66.01 CLASS 3 SEVERE OBESITY WITH SERIOUS COMORBIDITY AND BODY MASS INDEX (BMI) OF 40.0 TO 44.9 IN ADULT (HCC): Status: ACTIVE | Noted: 2022-02-17

## 2022-02-17 LAB
HBA1C MFR BLD: 11.5 % (ref 0–5.6)
INT CON NEG: ABNORMAL
INT CON POS: ABNORMAL

## 2022-02-17 PROCEDURE — 83036 HEMOGLOBIN GLYCOSYLATED A1C: CPT | Performed by: STUDENT IN AN ORGANIZED HEALTH CARE EDUCATION/TRAINING PROGRAM

## 2022-02-17 PROCEDURE — 99215 OFFICE O/P EST HI 40 MIN: CPT | Performed by: STUDENT IN AN ORGANIZED HEALTH CARE EDUCATION/TRAINING PROGRAM

## 2022-02-17 RX ORDER — KETOROLAC TROMETHAMINE 30 MG/ML
30 INJECTION, SOLUTION INTRAMUSCULAR; INTRAVENOUS ONCE
Status: COMPLETED | OUTPATIENT
Start: 2022-02-17 | End: 2022-02-17

## 2022-02-17 RX ORDER — PREDNISONE 20 MG/1
TABLET ORAL
COMMUNITY
Start: 2022-01-05 | End: 2022-02-17

## 2022-02-17 RX ORDER — METHOCARBAMOL 750 MG/1
TABLET, FILM COATED ORAL
COMMUNITY
Start: 2022-01-19 | End: 2022-02-17

## 2022-02-17 RX ORDER — PREDNISONE 50 MG/1
TABLET ORAL
COMMUNITY
Start: 2022-02-09 | End: 2022-02-17

## 2022-02-17 RX ORDER — GLIMEPIRIDE 2 MG/1
4 TABLET ORAL EVERY MORNING
COMMUNITY
Start: 2022-02-01 | End: 2022-12-15

## 2022-02-17 RX ORDER — INSULIN LISPRO 100 [IU]/ML
INJECTION, SOLUTION INTRAVENOUS; SUBCUTANEOUS
COMMUNITY
Start: 2022-02-01 | End: 2022-04-21 | Stop reason: SDUPTHER

## 2022-02-17 RX ORDER — MELOXICAM 7.5 MG/1
7.5-15 TABLET ORAL DAILY
Qty: 30 TABLET | Refills: 0 | Status: SHIPPED | OUTPATIENT
Start: 2022-02-17 | End: 2022-03-29 | Stop reason: SDUPTHER

## 2022-02-17 RX ORDER — CYCLOBENZAPRINE HCL 10 MG
5-10 TABLET ORAL 3 TIMES DAILY PRN
Qty: 30 TABLET | Refills: 1 | Status: SHIPPED | OUTPATIENT
Start: 2022-02-17 | End: 2022-03-24 | Stop reason: SDUPTHER

## 2022-02-17 RX ORDER — METHYLPREDNISOLONE 4 MG/1
TABLET ORAL
COMMUNITY
Start: 2022-01-19 | End: 2022-02-17

## 2022-02-17 RX ADMIN — KETOROLAC TROMETHAMINE 30 MG: 30 INJECTION, SOLUTION INTRAMUSCULAR; INTRAVENOUS at 15:24

## 2022-02-17 SDOH — ECONOMIC STABILITY - INCOME SECURITY: PROBLEM RELATED TO HOUSING AND ECONOMIC CIRCUMSTANCES, UNSPECIFIED: Z59.9

## 2022-02-17 ASSESSMENT — FIBROSIS 4 INDEX: FIB4 SCORE: 1.12

## 2022-02-17 ASSESSMENT — PATIENT HEALTH QUESTIONNAIRE - PHQ9: CLINICAL INTERPRETATION OF PHQ2 SCORE: 0

## 2022-02-17 NOTE — PATIENT INSTRUCTIONS
[facility]  [address]  [place] [address]  Phone: [phone]  Fax: [phone]    Glimepiride increase from 2mg daily to 4mg daily (2 tablets of the 2mg).    Long-acting insulin (lantus):  Adjust dose according to FASTING BLOOD GLUCOSE target  mg/dL  (1) Increase the insulin dose every 3-4 days as needed.   (2) Increase by 2 units if FBG average concentration is 131-170 mg/dL.   (3) Increase by 4 units if FBG average concentration is 171-210 mg/dL.   (4) Increase by 6 units if FBG average concentration is 221-260 mg/dL.   (5) Increase by 8 units if FBG average concentration is greater than 261mg/dL and call us.   Consider cutting back by 1-2 units to previous dose if glucose concentration is below 80 mg/dL or symptoms of hypoglycemia.     Meloxicam is a NSAID, so do not take other NSAIDs with this medication. Take it with food and plenty of water. You can start this tomorrow morning since you received the injection of a similar medication today.

## 2022-02-17 NOTE — PROGRESS NOTES
Subjective:     Chief Complaint   Patient presents with   • Follow-Up     Er fv      HPI:   Justin presents today to discuss ongoing back pain.    Has received phone call from an emergency department provider at [facility] on [facility] 2/9/2022 regarding that the patient had come in for his fourth ER visit for the same complaint (back pain).  The ER provider said he had CT results from 1/19/2022 revealing L45 canal stenosis with bilateral nerve root impingement.  At that time I ordered an MRI and referred to physiatry but patient has not heard to make an appointment.  Patient states that the pain is not there at rest but with any movement he has midline/bilateral low back pain that is sharp and radiates through his buttocks down into his hamstrings.  He reports chronic numbness in his feet without any changes, no focal weakness.  No fever, chills, saddle anesthesia or history of trauma.  He states that prednisone did not help at all, Toradol in the ER was quite effective.  Not taking any anti-inflammatories currently.  He does help while he is using it.  He is not taking any muscle relaxers but previously has been prescribed methocarbamol and Flexeril, states that Flexeril in the past was well-tolerated and effective for back pain prior.  He continues with gabapentin total of 1200mg (400+800mg) 3 times daily and denies adverse side effects to that.    He states that he stopped his Zoloft because he felt it was not necessary but continues with his Wellbutrin.  Denies any depression/anxiety.    Type 2 diabetes mellitus with hyperglycemia, with long-term current use of insulin (HCC)  This is a chronic condition.  Patient recently has not been taking his blood sugars.  He states that he is sure that prednisone was not helpful for his blood sugars as it made him a bit jittery when he was taking it.  He reports chronic bilateral foot numbness which is unchanged.  Weight has been slightly increased due to  dietary indiscretions.  He states that Ozempic was too expensive to continue so I stopped.      He continues with humalog 20u BID, lantus 64u daily, Metformin 1000 mg twice daily and glimepiride 2 mg daily.  Patient states that he is unable to reach down to care for his feet/clip his nails.  Denies any foot pain.    Current Outpatient Medications Ordered in Epic   Medication Sig Dispense Refill   • HUMALOG KWIKPEN 100 UNIT/ML Solution Pen-injector injection PEN INJECT UP TO 18 UNITS SUBCUTANEOUSLY TWICE DAILY WITH BREAKFAST AND DINNER ACCORDING TO SLIDING SCALE     • glimepiride (AMARYL) 2 MG Tab Take 2 mg by mouth every morning.     • cyclobenzaprine (FLEXERIL) 10 mg Tab Take 0.5-1 Tablets by mouth 3 times a day as needed for Moderate Pain or Muscle Spasms. 30 Tablet 1   • meloxicam (MOBIC) 7.5 MG Tab Take 1-2 Tablets by mouth every day. 30 Tablet 0   • lisinopril (PRINIVIL) 5 MG Tab Take 5 mg by mouth every day.     • metformin (GLUCOPHAGE) 1000 MG tablet Take 1,000 mg by mouth 2 times a day.     • buPROPion (WELLBUTRIN XL) 300 MG XL tablet Take 300 mg by mouth every day.     • gabapentin (NEURONTIN) 800 MG tablet Take 800 mg by mouth 3 times a day.     • gabapentin (NEURONTIN) 400 MG Cap Take 400 mg by mouth 3 times a day.     • buPROPion (WELLBUTRIN XL) 150 MG XL tablet Take 150 mg by mouth every day.     • insulin glargine (LANTUS SOLOSTAR) 100 UNIT/ML Solution Pen-injector injection Inject 64 Units under the skin every evening.       No current Epic-ordered facility-administered medications on file.     ROS:  Gen: no fevers/chills, weight gain  Eyes: no changes in vision  ENT: no sore throat  Pulm: no sob, no cough  CV: no chest pain, no palpitations  GI: no nausea/vomiting, no diarrhea  Skin: no rash  Neuro: no headaches  Heme/Lymph: no easy bruising    Objective:     Exam:  /78 (BP Location: Left arm, Patient Position: Sitting, BP Cuff Size: Large adult)   Pulse 96   Temp 37.2 °C (98.9 °F) (Temporal)  "  Ht 1.702 m (5' 7\")   Wt 124 kg (273 lb)   SpO2 91%   BMI 42.76 kg/m²  Body mass index is 42.76 kg/m².    Physical Exam:  Constitutional: Well-developed and well-nourished. No acute distress.   Skin: Skin is warm and dry.  Lower leg and feet quite dry, calluses on feet.  Long thickened nails.  No evidence of infection.  Head: Atraumatic without lesions.  Eyes: Conjunctivae and extraocular motions are normal. Pupils are equal, round. No scleral icterus.   Nose: Nares patent. No discharge.  Mouth/Throat: Oropharynx is moist.  Neck: Supple, trachea midline.   Cardiovascular: Regular rate and rhythm, S1 and S2 without murmur, rubs, or gallops.  Lungs: Normal inspiratory effort, CTA bilaterally, no wheezes/rhonchi/rales  Extremities: No cyanosis, clubbing, erythema, nor edema.   Musculoskeletal: Bunion bilateral feet.  Positive straight leg raise bilaterally.  Lumbar paraspinal hypertonicity with some tenderness.  Neurological: Alert and oriented x 3.  Normal casual gait, sensation to light touch intact but not with monofilament in bilateral feet.  +2 DTRs throughout lower extremity.  Monofilament testing with a 10 gram force: sensation intact: decreased bilaterally  Visual Inspection: Feet without maceration, ulcers, fissures.  Pedal pulses: decreased on right, +2 on the left  Psychiatric:  Behavior, mood, and affect are appropriate.    Assessment & Plan:     67 y.o. male with the following -     1. Type 2 diabetes mellitus with hyperglycemia, with long-term current use of insulin ([facility])  2. Financial difficulty  This is chronic condition, worsened from September last year as patient cites some dietary indiscretions.  Patient was lost to follow-up and was due to come back in October of last year.  Never was able to continue with Ozempic due to cost.  It seems that he has been seen by another provider who started him on Humalog.  I think he would benefit from establishing with our pharmacist who can also hopefully " aid with the cost of his medications and improve adherence to follow-up.  We will plan for A1c in 3 months.  In the meantime we will go ahead and increase Glimepiride from 2mg daily to 4mg daily (2 tablets of the 2mg) and provided instruction in the meantime on how to increase his Lantus based on his fasting blood glucose.  - POCT  A1C  - Referral to Pharmacotherapy Service    3. Hypertension associated with type 2 diabetes mellitus (HCC)  Chronic, well controlled.  Continue current regimen.    4. Diabetic polyneuropathy associated with type 2 diabetes mellitus (HCC)  5. Callus of foot  6. Bilateral bunions  7. Nail abnormalities  Continues with gabapentin for neuropathy, would benefit greatly from a podiatry referral.  - Referral to Podiatry    8. Chronic bilateral low back pain with bilateral sciatica  - cyclobenzaprine (FLEXERIL) 10 mg Tab; Take 0.5-1 Tablets by mouth 3 times a day as needed for Moderate Pain or Muscle Spasms.  Dispense: 30 Tablet; Refill: 1  - meloxicam (MOBIC) 7.5 MG Tab; Take 1-2 Tablets by mouth every day.  Dispense: 30 Tablet; Refill: 0  - ketorolac (TORADOL) injection 30 mg  9. Spinal stenosis of lumbar region with neurogenic claudication  10. Lumbar nerve root impingement  These are chronic conditions, worsening prompting for ER visits.  MRI is already been ordered, directed patient to schedule.  He already has a physiatry referral that has been approved, number provided.  He requests Toradol injection today as it has been helpful prior, provided.  As an NSAID has been helpful in the past but not currently taking we will start meloxicam 7.5 to 15 mg daily as above.  Expressed importance of taking with food and plenty of water.  He will let me know if this is effective so I can refill it if needed.  As he does have some hypertonicity/spasticity we can trial some Flexeril as well as above, states he is tolerated well in the past.  Can continue heat as needed.  Discussed return precautions/ER  precautions.    I spent a total of 52 minutes with record review, exam, communication with the patient, and documentation of this encounter.    Return in about 4 weeks (around 3/17/2022), or if symptoms worsen or fail to improve.    Please note that this dictation was created using voice recognition software. I have made every reasonable attempt to correct obvious errors, but I expect that there are errors of grammar and possibly content that I did not discover before finalizing the note.

## 2022-02-17 NOTE — ASSESSMENT & PLAN NOTE
This is a chronic condition.  Patient recently has not been taking his blood sugars.  He states that he is sure that prednisone was not helpful for his blood sugars as it made him a bit jittery when he was taking it.  He reports chronic bilateral foot numbness which is unchanged.  Weight has been slightly increased due to dietary indiscretions.  He states that Ozempic was too expensive to continue so I stopped.      He continues with humalog 20u BID, lantus 64u daily, Metformin 1000 mg twice daily and glimepiride 2 mg daily.  Patient states that he is unable to reach down to care for his feet/clip his nails.  Denies any foot pain.

## 2022-02-18 ENCOUNTER — TELEPHONE (OUTPATIENT)
Dept: VASCULAR LAB | Facility: MEDICAL CENTER | Age: 67
End: 2022-02-18
Payer: MEDICARE

## 2022-02-18 NOTE — TELEPHONE ENCOUNTER
Left voicemail message for patient to return call to [facility] to establish care      ABIGAIL Hawk, Clinical Pharmacist, CDE, CACP

## 2022-03-02 ENCOUNTER — TELEPHONE (OUTPATIENT)
Dept: VASCULAR LAB | Facility: MEDICAL CENTER | Age: 67
End: 2022-03-02
Payer: MEDICARE

## 2022-03-02 NOTE — TELEPHONE ENCOUNTER
Called pt regarding missed pharmacotherapy appt - no answer. LVM asking pt to c/b to reschedule.     Will f/u at a later time.    Mauricio Colunga, KatlynD, BCACP

## 2022-03-09 ENCOUNTER — TELEPHONE (OUTPATIENT)
Dept: VASCULAR LAB | Facility: MEDICAL CENTER | Age: 67
End: 2022-03-09
Payer: MEDICARE

## 2022-03-09 NOTE — TELEPHONE ENCOUNTER
We have been unable to establish care with this patient.  Pt has cancelled/no showed 3 appointments.  Pt has not responded to multiple calls/letters.  Will await patient contact.    CC: DO Elaine Nolan, Clinical Pharmacist, CDE, CACP

## 2022-03-15 ENCOUNTER — HOSPITAL ENCOUNTER (OUTPATIENT)
Dept: RADIOLOGY | Facility: MEDICAL CENTER | Age: 67
End: 2022-03-15
Attending: STUDENT IN AN ORGANIZED HEALTH CARE EDUCATION/TRAINING PROGRAM
Payer: MEDICARE

## 2022-03-15 DIAGNOSIS — M48.061 SPINAL STENOSIS OF LUMBAR REGION, UNSPECIFIED WHETHER NEUROGENIC CLAUDICATION PRESENT: ICD-10-CM

## 2022-03-15 DIAGNOSIS — M54.16 LUMBAR NERVE ROOT IMPINGEMENT: ICD-10-CM

## 2022-03-15 PROCEDURE — 72148 MRI LUMBAR SPINE W/O DYE: CPT | Mod: ME

## 2022-03-17 ENCOUNTER — TELEPHONE (OUTPATIENT)
Dept: URGENT CARE | Facility: PHYSICIAN GROUP | Age: 67
End: 2022-03-17

## 2022-03-17 NOTE — TELEPHONE ENCOUNTER
1. Name: Justin Caron    Call Back Number: [phone]      How would the patient prefer to be contacted with a response: Phone call OK to leave a detailed message    MRI Results    Mr Caron called and would like to have you call him back with the results of his MRI.     Thank you,    Cathi Bautista

## 2022-03-22 ENCOUNTER — TELEPHONE (OUTPATIENT)
Dept: MEDICAL GROUP | Facility: PHYSICIAN GROUP | Age: 67
End: 2022-03-22

## 2022-03-22 DIAGNOSIS — M54.41 CHRONIC BILATERAL LOW BACK PAIN WITH BILATERAL SCIATICA: ICD-10-CM

## 2022-03-22 DIAGNOSIS — M48.061 SPINAL STENOSIS OF LUMBAR REGION, UNSPECIFIED WHETHER NEUROGENIC CLAUDICATION PRESENT: ICD-10-CM

## 2022-03-22 DIAGNOSIS — M54.16 LUMBAR NERVE ROOT IMPINGEMENT: ICD-10-CM

## 2022-03-22 DIAGNOSIS — M54.42 CHRONIC BILATERAL LOW BACK PAIN WITH BILATERAL SCIATICA: ICD-10-CM

## 2022-03-22 DIAGNOSIS — G89.29 CHRONIC BILATERAL LOW BACK PAIN WITH BILATERAL SCIATICA: ICD-10-CM

## 2022-03-22 NOTE — TELEPHONE ENCOUNTER
Patient would like his results of his MRI and also stated he is still in pain. Please advise, thank you

## 2022-03-22 NOTE — TELEPHONE ENCOUNTER
Dr. Garcia,    Your patient called wanting to know if his results are back from his MRI.  He said he has called multiple times and no one has returned the call.  His symptoms are not improving so he wants to know what the next steps are?  I asked your MARyan to return his call.

## 2022-03-23 NOTE — TELEPHONE ENCOUNTER
Spoke to patient in regards to providers note. Did not want the number for the psychiatry referral that was placed.

## 2022-03-24 DIAGNOSIS — M54.42 CHRONIC BILATERAL LOW BACK PAIN WITH BILATERAL SCIATICA: ICD-10-CM

## 2022-03-24 DIAGNOSIS — M54.41 CHRONIC BILATERAL LOW BACK PAIN WITH BILATERAL SCIATICA: ICD-10-CM

## 2022-03-24 DIAGNOSIS — G89.29 CHRONIC BILATERAL LOW BACK PAIN WITH BILATERAL SCIATICA: ICD-10-CM

## 2022-03-24 RX ORDER — CYCLOBENZAPRINE HCL 10 MG
5-10 TABLET ORAL 3 TIMES DAILY PRN
Qty: 30 TABLET | Refills: 1 | Status: SHIPPED | OUTPATIENT
Start: 2022-03-24 | End: 2022-03-29

## 2022-03-24 NOTE — TELEPHONE ENCOUNTER
Phone Number Called: [phone]    Call outcome: Spoke to patient regarding message below.    Message: Called to inform patient that Dr Garcia would like him to come in for a follow up appointment. Pt scheduled for appointment on 3/29/2022 at 0700. Pt requesting refill of his muscle relaxer.    Received request via: Patient    Was the patient seen in the last year in this department? Yes    Does the patient have an active prescription (recently filled or refills available) for medication(s) requested? No     Requested Prescriptions     Pending Prescriptions Disp Refills   • cyclobenzaprine (FLEXERIL) 10 mg Tab 30 Tablet 1     Sig: Take 0.5-1 Tablets by mouth 3 times a day as needed for Moderate Pain or Muscle Spasms.

## 2022-03-29 ENCOUNTER — OFFICE VISIT (OUTPATIENT)
Dept: MEDICAL GROUP | Facility: PHYSICIAN GROUP | Age: 67
End: 2022-03-29
Payer: MEDICARE

## 2022-03-29 VITALS
TEMPERATURE: 97.7 F | HEART RATE: 98 BPM | SYSTOLIC BLOOD PRESSURE: 116 MMHG | WEIGHT: 270 LBS | HEIGHT: 67 IN | BODY MASS INDEX: 42.38 KG/M2 | OXYGEN SATURATION: 91 % | DIASTOLIC BLOOD PRESSURE: 74 MMHG

## 2022-03-29 DIAGNOSIS — M54.41 CHRONIC BILATERAL LOW BACK PAIN WITH BILATERAL SCIATICA: ICD-10-CM

## 2022-03-29 DIAGNOSIS — M48.062 SPINAL STENOSIS OF LUMBAR REGION WITH NEUROGENIC CLAUDICATION: ICD-10-CM

## 2022-03-29 DIAGNOSIS — E11.65 TYPE 2 DIABETES MELLITUS WITH HYPERGLYCEMIA, WITH LONG-TERM CURRENT USE OF INSULIN (HCC): ICD-10-CM

## 2022-03-29 DIAGNOSIS — M54.16 LUMBAR NERVE ROOT IMPINGEMENT: ICD-10-CM

## 2022-03-29 DIAGNOSIS — G89.29 CHRONIC BILATERAL LOW BACK PAIN WITH BILATERAL SCIATICA: ICD-10-CM

## 2022-03-29 DIAGNOSIS — Z79.4 TYPE 2 DIABETES MELLITUS WITH HYPERGLYCEMIA, WITH LONG-TERM CURRENT USE OF INSULIN (HCC): ICD-10-CM

## 2022-03-29 DIAGNOSIS — M54.42 CHRONIC BILATERAL LOW BACK PAIN WITH BILATERAL SCIATICA: ICD-10-CM

## 2022-03-29 PROCEDURE — 99214 OFFICE O/P EST MOD 30 MIN: CPT | Performed by: STUDENT IN AN ORGANIZED HEALTH CARE EDUCATION/TRAINING PROGRAM

## 2022-03-29 RX ORDER — MELOXICAM 7.5 MG/1
7.5-15 TABLET ORAL DAILY
Qty: 30 TABLET | Refills: 0 | Status: SHIPPED | OUTPATIENT
Start: 2022-03-29 | End: 2023-05-19

## 2022-03-29 RX ORDER — ZOLPIDEM TARTRATE 10 MG/1
TABLET ORAL
COMMUNITY
Start: 2022-03-25 | End: 2022-11-30

## 2022-03-29 ASSESSMENT — FIBROSIS 4 INDEX: FIB4 SCORE: 1.12

## 2022-03-29 NOTE — PATIENT INSTRUCTIONS
Increase Glimepiride from  4mg daily (2 tablets of the 2mg in the morning) to 4mg in the morning and 2mg in the afternoon.     Lets make another appointment with the pharamcist    Physiatry Alex (call today to make an appointment)  [address]  [place] [address]  Phone: [phone]  Fax: [phone]    Spine Surgery  (not authorized yet)  Referrals dept # [phone]    SURINDER CAMACHO DPM [facility]  [address]  [place] [address]  Phone: [phone]   Fax: [phone]    If you have not already this year, please make an appointment with a optometrist for retinal exam due to your diabetes

## 2022-03-29 NOTE — PROGRESS NOTES
Subjective:     Chief Complaint   Patient presents with   • Back Pain       HPI:   Justin presents today to go over his MRI and discuss his back pain.    Patient has not called to make an appoint with physiatry.  He states that he has minimal pain at rest, but with ambulation he has pain that goes down both legs.  At a baseline due to his diabetes he has numbness in his feet but otherwise denies to the back.    Denies:  - Fever or chills  - Recent illness or surgery  - Trauma  - Unexplained weight loss  - Night pain or pain at rest  - Incontinence  - Saddle anesthesia  - Weakness    Regarding his diabetes, he no showed his appointment with the pharmacy.  Some medications have been cost prohibitive. Increased Glimepiride from 2mg daily to 4mg daily (2 tablets of the 2mg).  States he is feeling better.  Averaging above 200 still however, but improved.  Also increased his Lantus from 64 to 70 units daily.  Continues with his other medications.    Current Outpatient Medications Ordered in Epic   Medication Sig Dispense Refill   • sertraline (ZOLOFT) 50 MG Tab      • zolpidem (AMBIEN) 10 MG Tab      • meloxicam (MOBIC) 7.5 MG Tab Take 1-2 Tablets by mouth every day. 30 Tablet 0   • HUMALOG KWIKPEN 100 UNIT/ML Solution Pen-injector injection PEN INJECT UP TO 18 UNITS SUBCUTANEOUSLY TWICE DAILY WITH BREAKFAST AND DINNER ACCORDING TO SLIDING SCALE     • glimepiride (AMARYL) 2 MG Tab Take 4 mg by mouth every morning.     • lisinopril (PRINIVIL) 5 MG Tab Take 5 mg by mouth every day.     • metformin (GLUCOPHAGE) 1000 MG tablet Take 1,000 mg by mouth 2 times a day.     • buPROPion (WELLBUTRIN XL) 300 MG XL tablet Take 300 mg by mouth every day.     • gabapentin (NEURONTIN) 800 MG tablet Take 800 mg by mouth 3 times a day.     • gabapentin (NEURONTIN) 400 MG Cap Take 400 mg by mouth 3 times a day.     • buPROPion (WELLBUTRIN XL) 150 MG XL tablet Take 150 mg by mouth every day.     • insulin glargine (LANTUS SOLOSTAR) 100 UNIT/ML  "Solution Pen-injector injection Inject 70 Units under the skin every evening.       No current Epic-ordered facility-administered medications on file.     ROS:  Gen: no fevers/chills, no changes in weight  Eyes: no changes in vision  ENT: no sore throat  Pulm: no sob, no cough  CV: no chest pain, no palpitations  GI: no nausea/vomiting, no diarrhea  : no dysuria  Skin: no rash  Neuro: no headaches  Heme/Lymph: no easy bruising    Objective:     Exam:  /74 (BP Location: Left arm, Patient Position: Sitting, BP Cuff Size: Large adult)   Pulse 98   Temp 36.5 °C (97.7 °F) (Temporal)   Ht 1.702 m (5' 7\")   Wt 122 kg (270 lb)   SpO2 91%   BMI 42.29 kg/m²  Body mass index is 42.29 kg/m².    Physical Exam:  Constitutional: Well-developed and well-nourished. No acute distress.   Skin: Skin is warm and dry. No rash noted.  Head: Atraumatic without lesions.  Eyes: Conjunctivae and extraocular motions are normal. Pupils are equal, round. No scleral icterus.   Nose: Nares patent. No discharge.  Mouth/Throat: Wearing mask.  Neck: Supple, trachea midline.   Cardiovascular: Regular rate and rhythm, S1 and S2 without murmur, rubs, or gallops.  Lungs: Normal inspiratory effort  Extremities: No cyanosis, clubbing, erythema, nor edema.  Musculoskeletal: Positive straight leg raise bilaterally, decreased range of motion in bilateral lower extremities in the hip with flexion.  Neurological: Alert and oriented x 3. No gross deficits.  +2 DTRs bilaterally LE.  Psychiatric:  Behavior, mood, and affect are appropriate.    Assessment & Plan:     67 y.o. male with the following -     1. Spinal stenosis of lumbar region with neurogenic claudication  2. Lumbar nerve root impingement  3. Chronic bilateral low back pain with bilateral sciatica  Is a chronic conditions, reviewed recent MRI results.  Recommend he stop Flexeril as it has not been effective.  NSAIDs in the past such as Toradol have been effective so we will again refill " meloxicam, patient states that he never took it.  In his multiple ER visits, prednisone was not helpful.  Agrees to trial of physical therapy.  Provided with phone number for physiatry and he is aware that he has a referral for spinal surgery which has not yet been authorized, gave number to referrals department.  Discussed ER precautions.  - meloxicam (MOBIC) 7.5 MG Tab; Take 1-2 Tablets by mouth every day.  Dispense: 30 Tablet; Refill: 0  - Referral to Physical Therapy    4. Type 2 diabetes mellitus with hyperglycemia, with long-term current use of insulin (HCC)  This is a chronic condition, still not well controlled.  Patient unfortunately no showed his pharmacy appointment.  Stressed importance of this as our pharmacist is able to get medications at lower cost which would be of great benefit to start a GLP-1.  For now we will increase his Glimepiride from 4mg daily (2 tablets of the 2mg in the morning) to 4mg in the morning and 2mg in the afternoon.     Return in about 4 weeks (around 4/26/2022), or if symptoms worsen or fail to improve.    Please note that this dictation was created using voice recognition software. I have made every reasonable attempt to correct obvious errors, but I expect that there are errors of grammar and possibly content that I did not discover before finalizing the note.

## 2022-04-12 ENCOUNTER — OFFICE VISIT (OUTPATIENT)
Dept: URGENT CARE | Facility: PHYSICIAN GROUP | Age: 67
End: 2022-04-12
Payer: MEDICARE

## 2022-04-12 VITALS
WEIGHT: 273 LBS | OXYGEN SATURATION: 94 % | BODY MASS INDEX: 41.37 KG/M2 | DIASTOLIC BLOOD PRESSURE: 72 MMHG | TEMPERATURE: 98.2 F | HEART RATE: 98 BPM | SYSTOLIC BLOOD PRESSURE: 118 MMHG | HEIGHT: 68 IN | RESPIRATION RATE: 16 BRPM

## 2022-04-12 DIAGNOSIS — E11.65 HYPERGLYCEMIA DUE TO DIABETES MELLITUS (HCC): ICD-10-CM

## 2022-04-12 DIAGNOSIS — R30.0 DYSURIA: ICD-10-CM

## 2022-04-12 DIAGNOSIS — Z86.39 HISTORY OF DIABETES MELLITUS: ICD-10-CM

## 2022-04-12 DIAGNOSIS — R63.1 POLYDIPSIA: ICD-10-CM

## 2022-04-12 DIAGNOSIS — R35.89 POLYURIA: ICD-10-CM

## 2022-04-12 DIAGNOSIS — R81 GLUCOSURIA: ICD-10-CM

## 2022-04-12 LAB
APPEARANCE UR: CLEAR
BILIRUB UR STRIP-MCNC: NEGATIVE MG/DL
COLOR UR AUTO: YELLOW
GLUCOSE BLD-MCNC: 406 MG/DL (ref 70–100)
GLUCOSE UR STRIP.AUTO-MCNC: 500 MG/DL
HBA1C MFR BLD: 11.8 % (ref 0–5.6)
INT CON NEG: ABNORMAL
INT CON POS: ABNORMAL
KETONES UR STRIP.AUTO-MCNC: NEGATIVE MG/DL
LEUKOCYTE ESTERASE UR QL STRIP.AUTO: NEGATIVE
NITRITE UR QL STRIP.AUTO: NEGATIVE
PH UR STRIP.AUTO: 5 [PH] (ref 5–8)
PROT UR QL STRIP: NEGATIVE MG/DL
RBC UR QL AUTO: NEGATIVE
SP GR UR STRIP.AUTO: 1.01
UROBILINOGEN UR STRIP-MCNC: 0.2 MG/DL

## 2022-04-12 PROCEDURE — 81002 URINALYSIS NONAUTO W/O SCOPE: CPT | Performed by: PHYSICIAN ASSISTANT

## 2022-04-12 PROCEDURE — 99213 OFFICE O/P EST LOW 20 MIN: CPT | Performed by: PHYSICIAN ASSISTANT

## 2022-04-12 PROCEDURE — 83036 HEMOGLOBIN GLYCOSYLATED A1C: CPT | Mod: GZ | Performed by: PHYSICIAN ASSISTANT

## 2022-04-12 PROCEDURE — 82962 GLUCOSE BLOOD TEST: CPT | Performed by: PHYSICIAN ASSISTANT

## 2022-04-12 ASSESSMENT — FIBROSIS 4 INDEX: FIB4 SCORE: 1.12

## 2022-04-12 NOTE — LETTER
April 12, 2022        Justin Gil      Hi Dr. Garcia.  I saw this patient in the urgent care for polyuria and polydipsia.  Blood glucose 406.  A1c 11.8.  No signs of UTI.  No mental status changes or abdominal pain.  Hoping you could get him in sooner than his scheduled appointment.  Falguni Gaitan PA Kiersten L. Gregory, P.A.-C.

## 2022-04-12 NOTE — PROGRESS NOTES
"Subjective:   Justin Gil is a 67 y.o. male who presents for UTI (frequent urination,x2 months)     Frequent urination x 1 month. No painful urination.  No gross hematuria.  No discharge.  Is diabetic.  Sugars run high.  Hasn't checked in a week.  Uses insulin and metformin, somewhat unreliably.  Significant polydipsia.  No abdominal pain, nausea, or vomiting.  No fever or chills.  No headache or altered mental status    HbA1C 11.5 in February.  Has pharmacotherapy services appt for for better management of his DM.    Does endorse coughing x 6 weeks; but it only occurs once/day.  Feels like he has a sinus infection.  Does have nasal congestion and green yellow discharge.  No headaches.  No sore throat.      Medications:  buPROPion  gabapentin  gabapentin Caps  glimepiride Tabs  HumaLOG KwikPen Sopn  Lantus SoloStar Sopn  lisinopril Tabs  meloxicam Tabs  metformin  sertraline Tabs  zolpidem Tabs    Allergies:             Patient has no known allergies.    Surgical History:         Past Surgical History:   Procedure Laterality Date   • BRONCHOSCOPY  2001   • UMBILICAL HERNIA REPAIR      at [facility]       Past Social Hx:  Justin Gil  reports that he quit smoking about 7 years ago. His smoking use included cigarettes. He has a 120.00 pack-year smoking history. He has never used smokeless tobacco. He reports that he does not drink alcohol and does not use drugs.     Past Family Hx:   Justin Gil family history includes COPD in his father, mother, and sister; Cancer in his father; Heart Disease in his mother.       Problem list, medications, and allergies reviewed by myself today in Epic.     Objective:     /72 (BP Location: Left arm, Patient Position: Sitting, BP Cuff Size: Large adult)   Pulse 98   Temp 36.8 °C (98.2 °F) (Temporal)   Resp 16   Ht 1.727 m (5' 8\")   Wt 124 kg (273 lb)   SpO2 94%   BMI 41.51 kg/m²     Physical Exam  Vitals and nursing note reviewed.   Constitutional:       " General: He is not in acute distress.     Appearance: Normal appearance. He is not ill-appearing.   HENT:      Head: Normocephalic.   Eyes:      Extraocular Movements: Extraocular movements intact.      Pupils: Pupils are equal, round, and reactive to light.   Cardiovascular:      Rate and Rhythm: Normal rate.      Pulses: Normal pulses.      Heart sounds: Normal heart sounds. No murmur heard.    No gallop.   Pulmonary:      Effort: Pulmonary effort is normal. No respiratory distress.      Breath sounds: Normal breath sounds. No stridor. No wheezing.   Abdominal:      Tenderness: There is no abdominal tenderness. There is no guarding or rebound.   Skin:     General: Skin is warm.      Findings: No rash.   Neurological:      Mental Status: He is alert and oriented to person, place, and time.   Psychiatric:         Thought Content: Thought content normal.         Judgment: Judgment normal.        Lungs clear to auscultation  Abdomen soft  No signs of altered mental status      Point-of-care glucose 406  Point-of-care A1c 11.8    Recent Labs     04/12/22  1210   POCGLUCOSE 406*     Lab Results   Component Value Date/Time    POCCOLOR yellow 04/12/2022 11:33 AM    POCAPPEAR clear 04/12/2022 11:33 AM    POCLEUKEST negative 04/12/2022 11:33 AM    POCNITRITE negative 04/12/2022 11:33 AM    POCUROBILIGE 0.2 04/12/2022 11:33 AM    POCPROTEIN negative 04/12/2022 11:33 AM    POCURPH 5.0 04/12/2022 11:33 AM    POCBLOOD negative 04/12/2022 11:33 AM    POCSPGRV 1.010 04/12/2022 11:33 AM    POCKETONES negative 04/12/2022 11:33 AM    POCBILIRUBIN negative 04/12/2022 11:33 AM    POCGLUCUA 500 04/12/2022 11:33 AM        Assessment/Plan:     Diagnosis and Associated Orders:     1. Polyuria    2. Polydipsia    3. Dysuria  - POCT Urinalysis  - POCT Glucose  - POCT  A1C    4. History of diabetes mellitus  - POCT Glucose  - POCT  A1C    5. Hyperglycemia due to diabetes mellitus (HCC)    6. Glucosuria        Comments/MDM:    Patient  presents with chief complaint polyuria.  Hemoglobin A1c 11.8.  Point-of-care blood glucose 406.  Poorly controlled diabetic.  Has PCP appointment at end of month.  I have contacted her to see if his appointment can be moved up.  He has no mental status changes.  No abdominal pain nausea or vomiting.  No other focal signs of infection.  Discussed polyuria likely related to hyperglycemia.  Discussed indications for return to ED.    I personally reviewed prior external notes and test results pertinent to today's visit.  Red flags discussed as well as indications to present to the Emergency Department.  Supportive care, natural history, differential diagnoses, and indications for immediate follow-up discussed.  Patient expresses understanding and agrees to plan.  Patient denies any other questions or concerns.    Follow-up with the primary care physician for recheck, reevaluation, and consideration of further management.      Please note that this dictation was created using voice recognition software. I have made a reasonable attempt to correct obvious errors, but I expect that there are errors of grammar and possibly content that I did not discover before finalizing the note.    This note was electronically signed by Falguni Fiore PA-C

## 2022-04-14 ENCOUNTER — DOCUMENTATION (OUTPATIENT)
Dept: MEDICAL GROUP | Facility: PHYSICIAN GROUP | Age: 67
End: 2022-04-14
Payer: MEDICARE

## 2022-04-14 NOTE — PROGRESS NOTES
I Left a voice message with Justin Gil to call the [place] [facility] to establish care for diabetes management.    Recent urgent care appointment with elevated glucose.  Provider requested we get him in as soon as possible.      PCP:  Juany Garcia D.O.  [facility] [facility] [address]  [facility] [id number] [phone]    [facility]  Phone [phone] fax [phone]

## 2022-04-15 ENCOUNTER — TELEPHONE (OUTPATIENT)
Dept: VASCULAR LAB | Facility: MEDICAL CENTER | Age: 67
End: 2022-04-15
Payer: MEDICARE

## 2022-04-15 NOTE — TELEPHONE ENCOUNTER
Rescheduled pt's appt to establish care w/ [facility].    He will be seen at [facility] on 4/21 (his earliest possible convenience).    Mauricio Colunga, KatlynD, BCACP

## 2022-04-20 ENCOUNTER — OFFICE VISIT (OUTPATIENT)
Dept: URGENT CARE | Facility: PHYSICIAN GROUP | Age: 67
End: 2022-04-20
Payer: MEDICARE

## 2022-04-20 VITALS
SYSTOLIC BLOOD PRESSURE: 118 MMHG | TEMPERATURE: 98.2 F | WEIGHT: 273 LBS | BODY MASS INDEX: 41.37 KG/M2 | HEIGHT: 68 IN | OXYGEN SATURATION: 90 % | HEART RATE: 72 BPM | DIASTOLIC BLOOD PRESSURE: 58 MMHG | RESPIRATION RATE: 16 BRPM

## 2022-04-20 DIAGNOSIS — J01.00 ACUTE NON-RECURRENT MAXILLARY SINUSITIS: ICD-10-CM

## 2022-04-20 PROCEDURE — 99214 OFFICE O/P EST MOD 30 MIN: CPT | Performed by: PHYSICIAN ASSISTANT

## 2022-04-20 RX ORDER — AMOXICILLIN AND CLAVULANATE POTASSIUM 875; 125 MG/1; MG/1
1 TABLET, FILM COATED ORAL 2 TIMES DAILY
Qty: 14 TABLET | Refills: 0 | Status: SHIPPED | OUTPATIENT
Start: 2022-04-20 | End: 2022-04-27

## 2022-04-20 ASSESSMENT — ENCOUNTER SYMPTOMS
CHILLS: 0
SHORTNESS OF BREATH: 0
DIAPHORESIS: 0
VOMITING: 0
ABDOMINAL PAIN: 0
SORE THROAT: 0
DIZZINESS: 0
WHEEZING: 0
SPUTUM PRODUCTION: 0
MYALGIAS: 0
DIARRHEA: 0
HEADACHES: 1
COUGH: 1
PALPITATIONS: 0
NAUSEA: 0
FEVER: 0
SINUS PAIN: 1

## 2022-04-20 ASSESSMENT — FIBROSIS 4 INDEX: FIB4 SCORE: 1.12

## 2022-04-20 NOTE — PROGRESS NOTES
Subjective:     CHIEF COMPLAINT  Chief Complaint   Patient presents with   • Sinus Problem     Congestion, pressure, headache comes and goes ongoing 2 weeks       HPI  Justin Gil is a 67 y.o. male who presents to the clinic with persistent sinus pain and pressure x2 weeks.  Patient states this feels identical to previous sinus infections.  States he was previously seen in clinic 1 week ago and discussed this however is very upset that he was not prescribed any medication.  Currently has pain and pressure over his bilateral frontal and maxillary sinuses.  His mucous is bright green. Has an occasional cough believed to be due to the postnasal drainage.  Denies any chest pain or shortness of breath.  Denies any history of asthma or COPD.  He has not been running a fever.  Denies any body aches or chills.  No known ill contacts.  He has not started any OTC medications at this time for her symptoms.  Describes a history of seasonal allergies currently not on any meds.    REVIEW OF SYSTEMS  Review of Systems   Constitutional: Negative for chills, diaphoresis, fever and malaise/fatigue.   HENT: Positive for congestion and sinus pain. Negative for ear pain and sore throat.    Respiratory: Positive for cough. Negative for sputum production, shortness of breath and wheezing.    Cardiovascular: Negative for chest pain and palpitations.   Gastrointestinal: Negative for abdominal pain, diarrhea, nausea and vomiting.   Musculoskeletal: Negative for myalgias.   Neurological: Positive for headaches. Negative for dizziness.   Endo/Heme/Allergies: Positive for environmental allergies.       PAST MEDICAL HISTORY  Patient Active Problem List    Diagnosis Date Noted   • Chronic bilateral low back pain with bilateral sciatica 02/17/2022   • Class 3 severe obesity with serious comorbidity and body mass index (BMI) of 40.0 to 44.9 in adult ([facility]) 02/17/2022   • Lumbar stenosis 02/09/2022   • Lumbar nerve root impingement 02/09/2022    • Hypertension associated with type 2 diabetes mellitus (HCC) 09/17/2021   • Arthritis of left wrist 09/16/2021   • Grief at loss of child 09/16/2021   • Diabetic polyneuropathy associated with type 2 diabetes mellitus (HCC) 09/16/2021   • Former cigarette smoker 09/16/2021   • Shortness of breath 09/16/2021   • Vaping nicotine dependence, tobacco product 09/16/2021   • Type 2 diabetes mellitus with hyperglycemia, with long-term current use of insulin (formerly [facility]) 09/16/2021   • Dyslipidemia associated with type 2 diabetes mellitus (HCC) 09/16/2021   • Bronchitis 03/01/2017   • Bilateral bunions 07/25/2014   • Allergic rhinitis 06/30/2014   • Lower leg pain 11/27/2013   • History of colonic polyps 12/22/2012   • Impaired fasting glucose 03/26/2012   • Acquired clawfoot 03/06/2012   • Asthma 05/11/2010   • Lumbosacral ligament sprain 05/10/2010       SURGICAL HISTORY   has a past surgical history that includes bronchoscopy (2001) and umbilical hernia repair.    ALLERGIES  No Known Allergies    CURRENT MEDICATIONS  Home Medications     Reviewed by Jayjay Shah P.A.-C. (Physician Assistant) on 04/20/22 at 1611  Med List Status: <None>   Medication Last Dose Status   buPROPion (WELLBUTRIN XL) 150 MG XL tablet Taking Active   buPROPion (WELLBUTRIN XL) 300 MG XL tablet Taking Active   gabapentin (NEURONTIN) 400 MG Cap Taking Active   gabapentin (NEURONTIN) 800 MG tablet Taking Active   glimepiride (AMARYL) 2 MG Tab Taking Active   HUMALOG KWIKPEN 100 UNIT/ML Solution Pen-injector injection PEN Taking Active   insulin glargine (LANTUS SOLOSTAR) 100 UNIT/ML Solution Pen-injector injection Taking Active   lisinopril (PRINIVIL) 5 MG Tab Taking Active   meloxicam (MOBIC) 7.5 MG Tab Taking Active   metformin (GLUCOPHAGE) 1000 MG tablet Taking Active   sertraline (ZOLOFT) 50 MG Tab Taking Active   zolpidem (AMBIEN) 10 MG Tab Taking Active                SOCIAL HISTORY  Social History     Tobacco Use   • Smoking status: Former  "Smoker     Packs/day: 2.00     Years: 60.00     Pack years: 120.00     Types: Cigarettes     Quit date: 2014     Years since quittin.5   • Smokeless tobacco: Never Used   Vaping Use   • Vaping Use: Former   • Substances: Nicotine   • Devices: Pre-filled or refillable cartridge, Refillable tank   Substance and Sexual Activity   • Alcohol use: Never   • Drug use: Never   • Sexual activity: Not on file       FAMILY HISTORY  Family History   Problem Relation Age of Onset   • Heart Disease Mother          at 84   • COPD Mother    • COPD Father          at 72   • Cancer Father         renal at 71   • COPD Sister    • Colon Cancer Neg Hx           Objective:     VITAL SIGNS: /58   Pulse 72   Temp 36.8 °C (98.2 °F) (Temporal)   Resp 16   Ht 1.727 m (5' 8\")   Wt 124 kg (273 lb)   SpO2 90%   BMI 41.51 kg/m²     PHYSICAL EXAM  Physical Exam  Constitutional:       General: He is not in acute distress.     Appearance: Normal appearance. He is not ill-appearing, toxic-appearing or diaphoretic.   HENT:      Head: Normocephalic and atraumatic.      Comments: Tenderness over the bilateral frontal and maxillary sinuses.     Right Ear: Tympanic membrane, ear canal and external ear normal.      Left Ear: Tympanic membrane, ear canal and external ear normal.      Nose: Congestion present. No rhinorrhea.      Mouth/Throat:      Mouth: Mucous membranes are moist.      Pharynx: No oropharyngeal exudate or posterior oropharyngeal erythema.   Eyes:      Conjunctiva/sclera: Conjunctivae normal.   Cardiovascular:      Rate and Rhythm: Normal rate and regular rhythm.      Pulses: Normal pulses.      Heart sounds: Normal heart sounds.   Pulmonary:      Effort: Pulmonary effort is normal.      Breath sounds: Normal breath sounds. No wheezing, rhonchi or rales.   Musculoskeletal:      Cervical back: Normal range of motion. No muscular tenderness.   Lymphadenopathy:      Cervical: No cervical adenopathy.   Skin:     " General: Skin is warm and dry.      Capillary Refill: Capillary refill takes less than 2 seconds.   Neurological:      Mental Status: He is alert.   Psychiatric:         Mood and Affect: Mood normal.         Thought Content: Thought content normal.         Assessment/Plan:     1. Acute non-recurrent maxillary sinusitis  - amoxicillin-clavulanate (AUGMENTIN) 875-125 MG Tab; Take 1 Tablet by mouth 2 times a day for 7 days.  Dispense: 14 Tablet; Refill: 0      MDM/Comments:    Patient presents today with persistent sinus pain and pressure x2 weeks.  He has not started any OTC medications at this time.  On exam he has tenderness to the frontal and maxillary sinuses.  Vitals are stable and within normal limits.  His O2 sat was low at 90%.  He states this is normal for him.  Lung sounds were clear to auscultation.  No signs of respiratory distress.  We will start the patient on a course of Augmentin.  He was very displeased that he is not going to be receiving prednisone.  I reviewed his last POCT fingerstick glucose from 1 week ago which was 406.  His last A1c was 11.8.  Discussed that I do not want to worsen his levels at this time.  I encouraged he start nasal sinus rinses and Flonase.  He does have scheduled follow-up with his PCP for diabetes management.    Differential diagnosis, natural history, supportive care, and indications for immediate follow-up discussed. All questions answered. Patient agrees with the plan of care.    Follow-up as needed if symptoms worsen or fail to improve to PCP, Urgent care or Emergency Room.    I have personally reviewed prior external notes and test results pertinent to today's visit.  I have independently reviewed and interpreted all diagnostics ordered during this urgent care acute visit.   Discussed management options (risks,benefits, and alternatives to treatment). Pt expresses understanding and the treatment plan was agreed upon. Questions were encouraged and answered to pt's  satisfaction.    Please note that this dictation was created using voice recognition software. I have made a reasonable attempt to correct obvious errors, but I expect that there are errors of grammar and possibly content that I did not discover before finalizing the note.

## 2022-04-21 ENCOUNTER — NON-PROVIDER VISIT (OUTPATIENT)
Dept: MEDICAL GROUP | Facility: PHYSICIAN GROUP | Age: 67
End: 2022-04-21
Payer: MEDICARE

## 2022-04-21 VITALS
HEIGHT: 68 IN | HEART RATE: 72 BPM | DIASTOLIC BLOOD PRESSURE: 58 MMHG | WEIGHT: 273 LBS | BODY MASS INDEX: 41.37 KG/M2 | SYSTOLIC BLOOD PRESSURE: 118 MMHG

## 2022-04-21 PROCEDURE — 99211 OFF/OP EST MAY X REQ PHY/QHP: CPT | Performed by: INTERNAL MEDICINE

## 2022-04-21 RX ORDER — INSULIN LISPRO 100 [IU]/ML
10-20 INJECTION, SOLUTION INTRAVENOUS; SUBCUTANEOUS
Qty: 6 EACH | Refills: 6 | Status: SHIPPED | OUTPATIENT
Start: 2022-04-21 | End: 2023-05-19

## 2022-04-21 ASSESSMENT — FIBROSIS 4 INDEX: FIB4 SCORE: 1.12

## 2022-04-21 NOTE — Clinical Note
Dr. Garcia,  You see him in 1 week.  I am working with him to get patient assistance for Jardiance and Trulicity.  Noticed his triglycerides are elevated and might benefit from a fibrate or fish oil, and possibly a statin.  I could address these at subsequent visits if you like.  Thanks, Amor

## 2022-04-21 NOTE — PROGRESS NOTES
Patient Consult Note     TIME IN: 333  TIME OUT: 404  Time with Pt 31    Primary care physician: Juany Garcia D.O.    Reason for consult: Management of Uncontrolled Type 2 Diabetes    HPI:  Justin Gil is a 67 y.o. old patient who comes in today for evaluation of above stated problem.    Allergies:  Patient has no known allergies.    Most Recent labs, A1c, and immunizations:    Lab Results   Component Value Date/Time    HBA1C 11.8 (A) 04/12/2022 12:10 PM          Lab Results   Component Value Date/Time    CHOLSTRLTOT 226 (H) 09/20/2021 08:38 AM    LDL see below 09/20/2021 08:38 AM    HDL 40 09/20/2021 08:38 AM    TRIGLYCERIDE 637 (H) 09/20/2021 08:38 AM       Lab Results   Component Value Date/Time    SODIUM 134 (L) 09/20/2021 08:38 AM    POTASSIUM 3.8 09/20/2021 08:38 AM    CHLORIDE 99 09/20/2021 08:38 AM    CO2 22 09/20/2021 08:38 AM    GLUCOSE 233 (H) 09/20/2021 08:38 AM    BUN 21 09/20/2021 08:38 AM    CREATININE 0.81 09/20/2021 08:38 AM     Lab Results   Component Value Date/Time    ALKPHOSPHAT 112 (H) 09/20/2021 08:38 AM    ASTSGOT 26 09/20/2021 08:38 AM    ALTSGPT 42 09/20/2021 08:38 AM    TBILIRUBIN 0.3 09/20/2021 08:38 AM    ALBUMIN 4.0 09/20/2021 08:38 AM      Lab Results   Component Value Date/Time    MALBCRT see below 09/21/2021 12:00 PM    MICROALBUR <1.2 09/21/2021 12:00 PM         Immunization History   Administered Date(s) Administered   • Influenza Vac Subunit Quad Inj (Pf) 12/14/2017   • Influenza Vaccine Quad Inj (Pf) 10/07/2020   • Influenza, Unspecified - HISTORICAL DATA 09/26/2019   • Pfizer SARS-CoV-2 Vaccine 12+ 03/23/2021, 04/13/2021   • Pneumococcal Conjugate Vaccine (Prevnar/PCV-13) 09/26/2019   • Tdap Vaccine 03/06/2012         Medications:    Current Outpatient Medications:   •  amoxicillin-clavulanate, 1 Tablet, Oral, BID  •  sertraline,   •  zolpidem,   •  meloxicam, 7.5-15 mg, Oral, DAILY  •  HumaLOG KwikPen, INJECT UP TO 18 UNITS SUBCUTANEOUSLY TWICE DAILY WITH BREAKFAST  AND DINNER ACCORDING TO SLIDING SCALE  •  glimepiride, 4 mg, Oral, QAM  •  lisinopril, 5 mg, Oral, DAILY  •  metformin, 1,000 mg, Oral, BID  •  buPROPion, 300 mg, Oral, DAILY  •  gabapentin, 800 mg, Oral, TID  •  gabapentin, 400 mg, Oral, TID  •  buPROPion, 150 mg, Oral, DAILY  •  Lantus SoloStar, 70 Units, Subcutaneous, Q EVENING    Diabetes Medication Current Regimen:  Metformin 1000 twice a day  Glimepiride 4 mg once daily  Lantus 70 units every HS  Not using Humalog     History/ Failed Diabetic Medications if applicable:  n/a    Preventative Management  BP regimen (ACE/ARB) - lisinopril   ASA - no  Statin - none  Last Retinal Scan - will get it done  Monofilament/foot exam - neuropathy     Pt has home glucometer and proper testing technique - yes  Pt reports blood sugars:   Before Breakfast: 200  Before Lunch:   Before Dinner:   Before Bedtime:   Other times:     Hypoglycemia awareness - yes  Nocturnal hypoglycemia- no  Hypoglycemia:  None    Physical Examination:  Vital signs: There were no vitals taken for this visit. There is no height or weight on file to calculate BMI.    Change in weight: Stable  Exercise habits: minimal exercise   Diet: common adult    Assessment and Plan:  1. DM2   • a1c above goal   • We will restart his Humalog  • He is filling out patient assistant paperwork for Jardiance and Trulicity  • At subsequent appointments hopefully we will be able to reduce Lantus/Humalog and eliminate glimepiride  • May benefit from Actos in the future  Medication(s) recommended:   Metformin 1000 twice a day  Glimepiride 4 mg once daily  Increase Lantus 75 units at bedtime  Restart Humalog at approximately 10 units twice daily    Long-acting insulin:   Adjust dose according to FASTING BLOOD GLUCOSE target  mg/dL  (1) Increase the insulin dose every 3-4 days as needed.   (2) Increase by 2 units if FBG average concentration is 131-170 mg/dL.   (3) Increase by 4 units if FBG average concentration is  171-210 mg/dL.   (4) Increase by 6 units if FBG average concentration is 221-260 mg/dL.   (5) Increase by 8 units if FBG average concentration is greater than 261mg/dL and call us.     Consider cutting back by 1-2 units to previous dose if glucose concentration is below 80 mg/dL or symptoms of hypoglycemia.     Correctional insulin dosing  Glucose reading  Change to be made to insulin dose    < 80 mg/dL  Subtract 1 unit     mg/dL  Usual premeal dose   (fixed or based on carbohydrate intake)    126-170 mg/dL  Add 1 unit    171-215 mg/dL  Add 2 units    216-260 mg/dL  Add 3 units       -Blood glucose and A1c target    • However, more aggressive diabetic control is reasonable when tolerated.  • Pt's treatment of Hypoglycemia. 15:15 Rule discussed with patient      2.  Cardiovascular  • Triglycerides above goal  • Likely benefit from a statin  • Likely benefit from a fibrate or fish oil  • Will reach out to PCP for a dyslipidemia referral  Medication(s) recommended.   • n/a      3.  Lifestyle changes   · Focus on eating a DASH/Mediterranean-style diet.   · Exercise 30 minutes daily at least 5 days/week, as tolerated.  · https://www.niddk.nih.gov/bwp    Follow-up appointment in 2 week(s)    Jw Quesada, PharmD, MS, BCACP, LCC  [facility]  Phone [phone] fax [phone]    This note was created using voice recognition software (Dragon). The accuracy of the dictation is limited by the abilities of the software. I have reviewed the note prior to signing, however some errors in grammar and context are still possible. If you have any questions related to this note please do not hesitate to contact our office.     CC:   Juany Garcia D.O.  No ref. provider found  Dr. Gene Oviedo

## 2022-04-21 NOTE — Clinical Note
Dr. Oviedo,   New diabetic patient.  I reached out to PCP for a modified referral so I could address his elevated triglycerides and cholesterol also.  I am having him fill out patient assistance paperwork for Jardiance and Trulicity, and restarted his Humalog with instructions on how to dose long and short acting insulins  Thanks, Amor

## 2022-04-21 NOTE — PATIENT INSTRUCTIONS
Metformin 1000 twice a day  Glimepiride 4 mg once daily  Increase Lantus 75 units at bedtime  Restart Humalog at approximately 10 units twice daily    Long-acting insulin:   Adjust dose according to FASTING BLOOD GLUCOSE target  mg/dL  (1) Increase the insulin dose every 3-4 days as needed.   (2) Increase by 2 units if FBG average concentration is 131-170 mg/dL.   (3) Increase by 4 units if FBG average concentration is 171-210 mg/dL.   (4) Increase by 6 units if FBG average concentration is 221-260 mg/dL.   (5) Increase by 8 units if FBG average concentration is greater than 261mg/dL and call us.   Consider cutting back by 1-2 units to previous dose if glucose concentration is below 80 mg/dL or symptoms of hypoglycemia.     Consider cutting back by 1-2 units to previous dose if glucose concentration is below 80 mg/dL or symptoms of hypoglycemia.     Correctional insulin dosing Humalog   Glucose reading  Change to be made to insulin dose    < 80 mg/dL  Subtract 1 unit     mg/dL  Usual premeal dose   (fixed or based on carbohydrate intake)    126-170 mg/dL  Add 1 unit    171-215 mg/dL  Add 2 units    216-260 mg/dL  Add 3 units       -Blood glucose and A1c target

## 2022-04-29 ENCOUNTER — OFFICE VISIT (OUTPATIENT)
Dept: MEDICAL GROUP | Facility: PHYSICIAN GROUP | Age: 67
End: 2022-04-29
Payer: MEDICARE

## 2022-04-29 VITALS
TEMPERATURE: 98.9 F | HEART RATE: 85 BPM | BODY MASS INDEX: 40.32 KG/M2 | DIASTOLIC BLOOD PRESSURE: 80 MMHG | WEIGHT: 266 LBS | SYSTOLIC BLOOD PRESSURE: 122 MMHG | OXYGEN SATURATION: 90 % | HEIGHT: 68 IN

## 2022-04-29 DIAGNOSIS — E11.59 HYPERTENSION ASSOCIATED WITH TYPE 2 DIABETES MELLITUS (HCC): ICD-10-CM

## 2022-04-29 DIAGNOSIS — E11.65 TYPE 2 DIABETES MELLITUS WITH HYPERGLYCEMIA, WITH LONG-TERM CURRENT USE OF INSULIN (HCC): ICD-10-CM

## 2022-04-29 DIAGNOSIS — G89.29 CHRONIC BILATERAL LOW BACK PAIN WITH BILATERAL SCIATICA: ICD-10-CM

## 2022-04-29 DIAGNOSIS — E11.69 DYSLIPIDEMIA ASSOCIATED WITH TYPE 2 DIABETES MELLITUS (HCC): ICD-10-CM

## 2022-04-29 DIAGNOSIS — M54.41 CHRONIC BILATERAL LOW BACK PAIN WITH BILATERAL SCIATICA: ICD-10-CM

## 2022-04-29 DIAGNOSIS — I15.2 HYPERTENSION ASSOCIATED WITH TYPE 2 DIABETES MELLITUS (HCC): ICD-10-CM

## 2022-04-29 DIAGNOSIS — M54.42 CHRONIC BILATERAL LOW BACK PAIN WITH BILATERAL SCIATICA: ICD-10-CM

## 2022-04-29 DIAGNOSIS — Z79.4 TYPE 2 DIABETES MELLITUS WITH HYPERGLYCEMIA, WITH LONG-TERM CURRENT USE OF INSULIN (HCC): ICD-10-CM

## 2022-04-29 DIAGNOSIS — E78.5 DYSLIPIDEMIA ASSOCIATED WITH TYPE 2 DIABETES MELLITUS (HCC): ICD-10-CM

## 2022-04-29 PROCEDURE — 99214 OFFICE O/P EST MOD 30 MIN: CPT | Performed by: STUDENT IN AN ORGANIZED HEALTH CARE EDUCATION/TRAINING PROGRAM

## 2022-04-29 RX ORDER — LISINOPRIL 5 MG/1
5 TABLET ORAL DAILY
Qty: 90 TABLET | Refills: 3 | Status: SHIPPED | OUTPATIENT
Start: 2022-04-29 | End: 2023-06-20

## 2022-04-29 RX ORDER — ATORVASTATIN CALCIUM 40 MG/1
40 TABLET, FILM COATED ORAL NIGHTLY
Qty: 90 TABLET | Refills: 3 | Status: SHIPPED | OUTPATIENT
Start: 2022-04-29 | End: 2023-05-19

## 2022-04-29 ASSESSMENT — FIBROSIS 4 INDEX: FIB4 SCORE: 1.12

## 2022-04-29 NOTE — PATIENT INSTRUCTIONS
Repeat fasting blood work for liver enzymes and cholesterol in 3 months.    [facility]  Dr. Saleh & Dr. Nash  [address]  [place] [address]  Phone: [phone]  Fax: [phone]    [facility]  [address] [place] [address]  Phone: [phone]    Ideal glucose ranges: fasting , random (1-2 hours after eating) <180. If glucose <80, we need to adjust your medication to prevent continuing low blood sugar.    Ideal blood pressure <130/80 and at least <140/90.      Long-acting insulin:   Adjust dose according to FASTING BLOOD GLUCOSE target  mg/dL  (1) Increase the insulin dose every 3-4 days as needed.   (2) Increase by 2 units if FBG average concentration is 131-170 mg/dL.   (3) Increase by 4 units if FBG average concentration is 171-210 mg/dL.   (4) Increase by 6 units if FBG average concentration is 221-260 mg/dL.   (5) Increase by 8 units if FBG average concentration is greater than 261mg/dL and call us.      Consider cutting back by 1-2 units to previous dose if glucose concentration is below 80 mg/dL or symptoms of hypoglycemia.      Correctional insulin dosing  Glucose reading  Change to be made to insulin dose    < 80 mg/dL  Subtract 1 unit     mg/dL  Usual premeal dose   (fixed or based on carbohydrate intake)    126-170 mg/dL  Add 1 unit    171-215 mg/dL  Add 2 units    216-260 mg/dL  Add 3 units     Please make an appointment for retinopathy screening.    Atorvastatin tablets  What is this medicine?  ATORVASTATIN (a TORE va sta tin) is known as a HMG-CoA reductase inhibitor or 'statin'. It lowers the level of cholesterol and triglycerides in the blood. This drug may also reduce the risk of heart attack, stroke, or other health problems in patients with risk factors for heart disease. Diet and lifestyle changes are often used with this drug.  This medicine may be used for other purposes; ask your health care provider or pharmacist if you  have questions.  COMMON BRAND NAME(S): Lipitor  What should I tell my health care provider before I take this medicine?  They need to know if you have any of these conditions:  · diabetes  · if you often drink alcohol  · history of stroke  · kidney disease  · liver disease  · muscle aches or weakness  · thyroid disease  · an unusual or allergic reaction to atorvastatin, other medicines, foods, dyes, or preservatives  · pregnant or trying to get pregnant  · breast-feeding  How should I use this medicine?  Take this medicine by mouth with a glass of water. Follow the directions on the prescription label. You can take it with or without food. If it upsets your stomach, take it with food. Do not take with grapefruit juice. Take your medicine at regular intervals. Do not take it more often than directed. Do not stop taking except on your doctor's advice.  Talk to your pediatrician regarding the use of this medicine in children. While this drug may be prescribed for children as young as 10 for selected conditions, precautions do apply.  Overdosage: If you think you have taken too much of this medicine contact a poison control center or emergency room at once.  NOTE: This medicine is only for you. Do not share this medicine with others.  What if I miss a dose?  If you miss a dose, take it as soon as you can. If your next dose is to be taken in less than 12 hours, then do not take the missed dose. Take the next dose at your regular time. Do not take double or extra doses.  What may interact with this medicine?  Do not take this medicine with any of the following medications:  · dasabuvir; ombitasvir; paritaprevir; ritonavir  · ombitasvir; paritaprevir; ritonavir  · posaconazole  · red yeast rice  This medicine may also interact with the following medications:  · alcohol  · birth control pills  · certain antibiotics like erythromycin and clarithromycin  · certain antivirals for HIV or hepatitis  · certain medicines for  cholesterol like fenofibrate, gemfibrozil, and niacin  · certain medicines for fungal infections like ketoconazole and itraconazole  · colchicine  · cyclosporine  · digoxin  · grapefruit juice  · rifampin  This list may not describe all possible interactions. Give your health care provider a list of all the medicines, herbs, non-prescription drugs, or dietary supplements you use. Also tell them if you smoke, drink alcohol, or use illegal drugs. Some items may interact with your medicine.  What should I watch for while using this medicine?  Visit your doctor or health care professional for regular check-ups. You may need regular tests to make sure your liver is working properly.  Your health care professional may tell you to stop taking this medicine if you develop muscle problems. If your muscle problems do not go away after stopping this medicine, contact your health care professional.  Do not become pregnant while taking this medicine. Women should inform their health care professional if they wish to become pregnant or think they might be pregnant. There is a potential for serious side effects to an unborn child. Talk to your health care professional or pharmacist for more information. Do not breast-feed an infant while taking this medicine.  This medicine may increase blood sugar. Ask your healthcare provider if changes in diet or medicines are needed if you have diabetes.  If you are going to need surgery or other procedure, tell your doctor that you are using this medicine.  This drug is only part of a total heart-health program. Your doctor or a dietician can suggest a low-cholesterol and low-fat diet to help. Avoid alcohol and smoking, and keep a proper exercise schedule.  This medicine may cause a decrease in Co-Enzyme Q-10. You should make sure that you get enough Co-Enzyme Q-10 while you are taking this medicine. Discuss the foods you eat and the vitamins you take with your health care professional.  What  side effects may I notice from receiving this medicine?  Side effects that you should report to your doctor or health care professional as soon as possible:  · allergic reactions like skin rash, itching or hives, swelling of the face, lips, or tongue  · fever  · joint pain  · loss of memory  · redness, blistering, peeling or loosening of the skin, including inside the mouth  · signs and symptoms of high blood sugar such as being more thirsty or hungry or having to urinate more than normal. You may also feel very tired or have blurry vision.  · signs and symptoms of liver injury like dark yellow or brown urine; general ill feeling or flu-like symptoms; light-belly pain; unusually weak or tired; yellowing of the eyes or skin  · signs and symptoms of muscle injury like dark urine; trouble passing urine or change in the amount of urine; unusually weak or tired; muscle pain or side or back pain  Side effects that usually do not require medical attention (report to your doctor or health care professional if they continue or are bothersome):  · diarrhea  · nausea  · stomach pain  · trouble sleeping  · upset stomach  This list may not describe all possible side effects. Call your doctor for medical advice about side effects. You may report side effects to FDA at 2-895-FDA-4741.  Where should I keep my medicine?  Keep out of the reach of children.  Store between 20 and 25 degrees C (68 and 77 degrees F). Throw away any unused medicine after the expiration date.  NOTE: This sheet is a summary. It may not cover all possible information. If you have questions about this medicine, talk to your doctor, pharmacist, or health care provider.  © 2020 [employer]/Gold Standard (2019-10-09 11:36:16)

## 2022-04-29 NOTE — PROGRESS NOTES
Subjective:     Chief Complaint   Patient presents with   • Follow-Up     HPI:   Justin presents today with    Type 2 diabetes mellitus with hyperglycemia, with long-term current use of insulin (HCC)  This is a chronic condition.  Since his last visit he has been to urgent care for blood sugars in the 400s and did follow-up with pharmacy not long ago.  He has restarted Humalog but has not increased his Lantus, continues with 70 units at bedtime.  He is concerned to raise this as he does not want low blood sugar.  Not checking his blood sugar every day but yesterday his fasting was 240.  No longer having blood sugar in the 400s.    I reviewed the pharmacist recommendations and he was not aware of any application for Jardiance or Trulicity.    We discussed his elevated cholesterol.  Patient states that he has never been on medication for cholesterol prior.    Regarding his low back pain he reports that he is improved.  Has not had any appointments scheduled with physical therapy and never made an appointment with spine surgery.  Not interested in surgery.  Happy that it is improved, does mention that no one ever discussed his MRI results.    Current Outpatient Medications Ordered in Epic   Medication Sig Dispense Refill   • atorvastatin (LIPITOR) 40 MG Tab Take 1 Tablet by mouth every evening. 90 Tablet 3   • lisinopril (PRINIVIL) 5 MG Tab Take 1 Tablet by mouth every day. 90 Tablet 3   • HUMALOG KWIKPEN 100 UNIT/ML Solution Pen-injector injection PEN Inject 10-20 Units under the skin 3 times a day before meals. 6 Each 6   • sertraline (ZOLOFT) 50 MG Tab      • zolpidem (AMBIEN) 10 MG Tab      • meloxicam (MOBIC) 7.5 MG Tab Take 1-2 Tablets by mouth every day. 30 Tablet 0   • glimepiride (AMARYL) 2 MG Tab Take 4 mg by mouth every morning.     • metformin (GLUCOPHAGE) 1000 MG tablet Take 1,000 mg by mouth 2 times a day.     • buPROPion (WELLBUTRIN XL) 300 MG XL tablet Take 300 mg by mouth every day.     • gabapentin  "(NEURONTIN) 800 MG tablet Take 800 mg by mouth 3 times a day.     • gabapentin (NEURONTIN) 400 MG Cap Take 400 mg by mouth 3 times a day.     • buPROPion (WELLBUTRIN XL) 150 MG XL tablet Take 150 mg by mouth every day.     • insulin glargine (LANTUS SOLOSTAR) 100 UNIT/ML Solution Pen-injector injection Inject 70 Units under the skin every evening.       No current Epic-ordered facility-administered medications on file.     ROS:  Gen: no fevers/chills, no changes in weight  Eyes: no changes in vision  ENT: no sore throat, no hearing loss, no bloody nose  Pulm: no sob, no cough  CV: no chest pain, no palpitations  GI: no nausea/vomiting, no diarrhea  : no dysuria  MSk: no myalgias  Skin: no rash  Neuro: no headaches, no numbness/tingling  Heme/Lymph: no easy bruising    Objective:     Exam:  /80 (BP Location: Left arm, Patient Position: Sitting, BP Cuff Size: Adult)   Pulse 85   Temp 37.2 °C (98.9 °F) (Temporal)   Ht 1.727 m (5' 8\")   Wt 121 kg (266 lb)   SpO2 90%   BMI 40.45 kg/m²  Body mass index is 40.45 kg/m².    Physical Exam:  Constitutional: Well-developed and well-nourished. No acute distress.   Skin: Skin is warm and dry. No rash noted.  Head: Atraumatic without lesions.  Eyes: Conjunctivae and extraocular motions are normal. Pupils are equal, round. No scleral icterus.   Mouth/Throat: Wearing mask.  Neck: Supple, trachea midline.  Cardiovascular: Regular rate and rhythm, S1 and S2 without murmur, rubs, or gallops.  Lungs: Normal inspiratory effort, CTA bilaterally, no wheezes/rhonchi/rales  Extremities: No cyanosis, clubbing, erythema, nor edema.  Neurological: Alert and oriented x 3. No gross deficits.  Psychiatric:  Behavior, mood, and affect are appropriate.    Assessment & Plan:     67 y.o. male with the following -     1. Type 2 diabetes mellitus with hyperglycemia, with long-term current use of insulin (HCC)  Chronic, improved from prior but still not well controlled.  Reiterated pharmacy " recommendations to adjust his insulin.  Provided him with [employer] application so he can have that completed prior to his visit with pharmacy coming up in May-I think he would really benefit from GLP-1.  Discussed ideal glucose ranges and urgent care/ER precautions.  - VITAMIN B12; Future    2. Dyslipidemia associated with type 2 diabetes mellitus (HCC)  Reviewed ASCVD risk and cholesterol results from last year.  Agrees to start atorvastatin as below, will repeat labs in 3 months. Plan to discuss adding ASA next visit, currently feels overwhelmed by medications.  - atorvastatin (LIPITOR) 40 MG Tab; Take 1 Tablet by mouth every evening.  Dispense: 90 Tablet; Refill: 3  - Comp Metabolic Panel; Future  - Lipid Profile; Future  - TSH WITH REFLEX TO FT4; Future    3. Hypertension associated with type 2 diabetes mellitus (HCC)  Chronic, well controlled. No changes to current treatment.  - lisinopril (PRINIVIL) 5 MG Tab; Take 1 Tablet by mouth every day.  Dispense: 90 Tablet; Refill: 3    4. Chronic bilateral low back pain with bilateral sciatica  This is a chronic condition, improved from last visit.  Discussed his MRI results again with him, they were reviewed in detail as well at his last appointment.  Encouraged him to make an appointment for physical therapy to prevent future episodes of worsening.  He also has a referral to spinal surgery but declines at this time.    Return in about 3 months (around 7/29/2022), or if symptoms worsen or fail to improve, for cholesterol.    Please note that this dictation was created using voice recognition software. I have made every reasonable attempt to correct obvious errors, but I expect that there are errors of grammar and possibly content that I did not discover before finalizing the note.

## 2022-04-29 NOTE — ASSESSMENT & PLAN NOTE
This is a chronic condition.  Since his last visit he has been to urgent care for blood sugars in the 400s and did follow-up with pharmacy not long ago.  He has restarted Humalog but has not increased his Lantus, continues with 70 units at bedtime.  He is concerned to raise this as he does not want low blood sugar.  Not checking his blood sugar every day but yesterday his fasting was 240.  No longer having blood sugar in the 400s.    I reviewed the pharmacist recommendations and he was not aware of any application for Jardiance or Trulicity.

## 2022-05-13 ENCOUNTER — DOCUMENTATION (OUTPATIENT)
Dept: VASCULAR LAB | Facility: MEDICAL CENTER | Age: 67
End: 2022-05-13
Payer: MEDICARE

## 2022-05-13 NOTE — PROGRESS NOTES
LM for patient to c/b and reschedule recently no-showed pharmacotherapy f/u for DMII.  Kareem Valladares, PharmD, BCACP

## 2022-05-20 ENCOUNTER — DOCUMENTATION (OUTPATIENT)
Dept: VASCULAR LAB | Facility: MEDICAL CENTER | Age: 67
End: 2022-05-20
Payer: MEDICARE

## 2022-05-20 NOTE — PROGRESS NOTES
[facility]      LVM for the patient to please call the clinic back to reschedule missed appt for follow up on DM management.       Gabi PottsD

## 2022-05-27 ENCOUNTER — DOCUMENTATION (OUTPATIENT)
Dept: VASCULAR LAB | Facility: MEDICAL CENTER | Age: 67
End: 2022-05-27
Payer: MEDICARE

## 2022-05-27 NOTE — PROGRESS NOTES
LM for patient to call back and reschedule recently no-showed pharmacotherapy appt for diabetes.  Kareem Valladares, PharmD, BCACP

## 2022-06-03 ENCOUNTER — TELEPHONE (OUTPATIENT)
Dept: VASCULAR LAB | Facility: MEDICAL CENTER | Age: 67
End: 2022-06-03
Payer: MEDICARE

## 2022-06-10 ENCOUNTER — DOCUMENTATION (OUTPATIENT)
Dept: VASCULAR LAB | Facility: MEDICAL CENTER | Age: 67
End: 2022-06-10
Payer: MEDICARE

## 2022-06-10 NOTE — PROGRESS NOTES
LM with patient to c/b and reschedule recently missed pharmacotherapy f/u for DMII.  Kareem Valladares, PharmD, BCACP

## 2022-06-17 ENCOUNTER — TELEPHONE (OUTPATIENT)
Dept: VASCULAR LAB | Facility: MEDICAL CENTER | Age: 67
End: 2022-06-17
Payer: MEDICARE

## 2022-06-17 NOTE — TELEPHONE ENCOUNTER
Called pt regarding missed pharmacotherapy appt - [place].     Asks c/b around 7/5 as this is when he will return.    Will f/u accordingly.    Mauricio Colunga, KatlynD, BCACP

## 2022-07-05 ENCOUNTER — OFFICE VISIT (OUTPATIENT)
Dept: URGENT CARE | Facility: PHYSICIAN GROUP | Age: 67
End: 2022-07-05
Payer: MEDICARE

## 2022-07-05 VITALS
OXYGEN SATURATION: 88 % | HEIGHT: 68 IN | SYSTOLIC BLOOD PRESSURE: 126 MMHG | RESPIRATION RATE: 16 BRPM | TEMPERATURE: 99.4 F | BODY MASS INDEX: 40.32 KG/M2 | DIASTOLIC BLOOD PRESSURE: 80 MMHG | HEART RATE: 97 BPM | WEIGHT: 266 LBS

## 2022-07-05 DIAGNOSIS — M71.50 TRAUMATIC BURSITIS: ICD-10-CM

## 2022-07-05 PROCEDURE — 99213 OFFICE O/P EST LOW 20 MIN: CPT | Performed by: FAMILY MEDICINE

## 2022-07-05 ASSESSMENT — FIBROSIS 4 INDEX: FIB4 SCORE: 1.12

## 2022-07-05 NOTE — PROGRESS NOTES
"  Subjective:      67 y.o. male presents to urgent care for bilateral hip pain that started on Sunday.  He denies any inciting event or trauma at that time.  The pain is intermittent, he is unsure of any alleviating or aggravating factors, it is described as throbbing, currently rated 8/10.  He has not taken anything for his pain.  He denies any other cold symptoms such as headache, sore throat, cough, fever, or diarrhea.  He has not had anything like this previously.    He denies any other questions or concerns at this time.    Current problem list, medication, and past medical/surgical history were reviewed in Epic.    ROS  See HPI     Objective:      /80 (BP Location: Left arm, Patient Position: Sitting, BP Cuff Size: Adult)   Pulse 97   Temp 37.4 °C (99.4 °F) (Temporal)   Resp 16   Ht 1.727 m (5' 8\")   Wt 121 kg (266 lb)   SpO2 88%   BMI 40.45 kg/m²     Physical Exam  Constitutional:       General: He is not in acute distress.     Appearance: He is not diaphoretic.   Cardiovascular:      Rate and Rhythm: Normal rate and regular rhythm.      Heart sounds: Normal heart sounds.   Pulmonary:      Effort: Pulmonary effort is normal. No respiratory distress.      Breath sounds: Normal breath sounds.   Musculoskeletal:      Comments: Active range of motion of hips remains bilaterally.  No discolorations or deformities noted to inspection of hips.  He is point tender over his greater trochanteric area bilaterally.   Neurological:      Mental Status: He is alert.   Psychiatric:         Mood and Affect: Affect normal.         Judgment: Judgment normal.       Assessment/Plan:     1. Traumatic bursitis  Discussed regular use of NSAIDs, ice, heat, and stretches.  If does not improve may need steroid injection.      Instructed to return to Urgent Care or nearest Emergency Department if symptoms fail to improve, for any change in condition, further concerns, or new concerning symptoms. Patient states understanding " of the plan of care and discharge instructions.    Joellen Weston M.D.

## 2022-07-12 ENCOUNTER — TELEPHONE (OUTPATIENT)
Dept: VASCULAR LAB | Facility: MEDICAL CENTER | Age: 67
End: 2022-07-12
Payer: MEDICARE

## 2022-07-12 NOTE — TELEPHONE ENCOUNTER
Pt missed their f/u DM appointment on 5/12.    7th call  LVM to reschedule.     Nadine Fernández, PharmD

## 2022-08-01 ENCOUNTER — TELEPHONE (OUTPATIENT)
Dept: MEDICAL GROUP | Facility: PHYSICIAN GROUP | Age: 67
End: 2022-08-01

## 2022-08-01 NOTE — TELEPHONE ENCOUNTER
1. Name: Justin Cameron Caron      Call Back Number: [phone] (home)         How would the patient prefer to be contacted with a response: Phone call OK to leave a detailed message    2. Which medication(s) is being requested?   insulin glargine (LANTUS SOLOSTAR) 100 UNIT/ML Solution Pen-injector injection    3. What is the preferred Pharmacy?   [facility] [address]    Patient was informed they may receive a return phone call from our office with any additional questions before processing this request.

## 2022-08-02 DIAGNOSIS — E11.65 TYPE 2 DIABETES MELLITUS WITH HYPERGLYCEMIA, WITH LONG-TERM CURRENT USE OF INSULIN (HCC): ICD-10-CM

## 2022-08-02 DIAGNOSIS — Z79.4 TYPE 2 DIABETES MELLITUS WITH HYPERGLYCEMIA, WITH LONG-TERM CURRENT USE OF INSULIN (HCC): ICD-10-CM

## 2022-08-02 RX ORDER — INSULIN GLARGINE 100 [IU]/ML
70 INJECTION, SOLUTION SUBCUTANEOUS EVERY EVENING
Qty: 3 ML | Refills: 10 | Status: SHIPPED | OUTPATIENT
Start: 2022-08-02 | End: 2023-03-30 | Stop reason: SDUPTHER

## 2022-08-17 ENCOUNTER — DOCUMENTATION (OUTPATIENT)
Dept: VASCULAR LAB | Facility: MEDICAL CENTER | Age: 67
End: 2022-08-17
Payer: MEDICARE

## 2022-08-17 NOTE — PROGRESS NOTES
Once again attempted to reach patient to reschedule f/u with pharmacotherapy clinic for DMII.  No answer to call, left VM.  Will await further contact from patient or PCP.  Kareem Valladares, KatlynD, BCACP

## 2022-08-30 ENCOUNTER — OFFICE VISIT (OUTPATIENT)
Dept: URGENT CARE | Facility: PHYSICIAN GROUP | Age: 67
End: 2022-08-30
Payer: MEDICARE

## 2022-08-30 ENCOUNTER — HOSPITAL ENCOUNTER (OUTPATIENT)
Facility: MEDICAL CENTER | Age: 67
End: 2022-08-30
Attending: FAMILY MEDICINE
Payer: MEDICARE

## 2022-08-30 VITALS
RESPIRATION RATE: 16 BRPM | TEMPERATURE: 99.2 F | WEIGHT: 266 LBS | HEIGHT: 68 IN | SYSTOLIC BLOOD PRESSURE: 116 MMHG | BODY MASS INDEX: 40.32 KG/M2 | HEART RATE: 105 BPM | OXYGEN SATURATION: 90 % | DIASTOLIC BLOOD PRESSURE: 68 MMHG

## 2022-08-30 DIAGNOSIS — R00.0 TACHYCARDIA: ICD-10-CM

## 2022-08-30 DIAGNOSIS — Z03.818 ENCOUNTER FOR PATIENT CONCERN ABOUT EXPOSURE TO INFECTIOUS ORGANISM: ICD-10-CM

## 2022-08-30 LAB — COVID ORDER STATUS COVID19: NORMAL

## 2022-08-30 PROCEDURE — U0003 INFECTIOUS AGENT DETECTION BY NUCLEIC ACID (DNA OR RNA); SEVERE ACUTE RESPIRATORY SYNDROME CORONAVIRUS 2 (SARS-COV-2) (CORONAVIRUS DISEASE [COVID-19]), AMPLIFIED PROBE TECHNIQUE, MAKING USE OF HIGH THROUGHPUT TECHNOLOGIES AS DESCRIBED BY CMS-2020-01-R: HCPCS

## 2022-08-30 PROCEDURE — 99214 OFFICE O/P EST MOD 30 MIN: CPT | Mod: CS | Performed by: FAMILY MEDICINE

## 2022-08-30 PROCEDURE — U0005 INFEC AGEN DETEC AMPLI PROBE: HCPCS

## 2022-08-30 ASSESSMENT — FIBROSIS 4 INDEX: FIB4 SCORE: 1.12

## 2022-08-30 NOTE — PROGRESS NOTES
"  Subjective:      67 y.o. male presents to urgent care for cold symptoms that started Saturday.  He is experiencing cough, sore throat, fever, and increased sinus pressure. No headaches, body aches, or diarrhea. Heart rate is elevated today in urgent care.  He denies any chest pain, palpitations, shortness of breath. He has been using Tylenol with moderate relief in symptoms. He denies any tobacco product use.  No history of asthma or COPD.  He is fully vaccinated against COVID.  No known sick contacts.    He denies any other questions or concerns at this time.    Current problem list, medication, and past medical/surgical history were reviewed in Epic.    ROS  See HPI     Objective:      /68 (BP Location: Left arm, Patient Position: Sitting, BP Cuff Size: Adult)   Pulse (!) 105   Temp 37.3 °C (99.2 °F) (Temporal)   Resp 16   Ht 1.727 m (5' 8\")   Wt 121 kg (266 lb)   SpO2 90%   BMI 40.45 kg/m²     Physical Exam  Constitutional:       General: He is not in acute distress.     Appearance: He is not diaphoretic.   HENT:      Right Ear: Tympanic membrane, ear canal and external ear normal.      Left Ear: Tympanic membrane, ear canal and external ear normal.      Nose:      Right Sinus: No maxillary sinus tenderness or frontal sinus tenderness.      Left Sinus: No maxillary sinus tenderness or frontal sinus tenderness.      Mouth/Throat:      Tongue: Tongue does not deviate from midline.      Palate: No lesions.      Pharynx: Uvula midline. Posterior oropharyngeal erythema present.      Tonsils: No tonsillar exudate. 1+ on the right. 1+ on the left.   Cardiovascular:      Rate and Rhythm: Normal rate and regular rhythm.      Heart sounds: Normal heart sounds.   Pulmonary:      Effort: Pulmonary effort is normal. No respiratory distress.      Breath sounds: Normal breath sounds.   Neurological:      Mental Status: He is alert.   Psychiatric:         Mood and Affect: Affect normal.         Judgment: Judgment " normal.     Assessment/Plan:     1. Encounter for patient concern about exposure to infectious organism  2. Tachycardia  Systemic symptoms seen through tachycardia. Asymptomatic. Testing performed for COVID-19. In the meantime patient was advised to isolate until COVID test results returned. I encouraged mask use, frequent handwashing, wiping down hard surfaces, etc. Tylenol and Ibuprofen were recommended for symptomatic relief. If positive they will be contacted by their local health department regarding return to work/school protocols. Patient is currently without indication of need for higher level of care. Patient/Guardian was given precautionary signs/symptoms that mandate immediate follow up and evaluation in the ED. The patient and/or guardian demonstrated a good understanding and agreed with the treatment plan.  - SARS-CoV-2 PCR (24 hour In-House): Collect NP swab in VTM; Future        Instructed to return to Urgent Care or nearest Emergency Department if symptoms fail to improve, for any change in condition, further concerns, or new concerning symptoms. Patient states understanding of the plan of care and discharge instructions.    Joellen Weston M.D.

## 2022-08-31 LAB
SARS-COV-2 RNA RESP QL NAA+PROBE: NOTDETECTED
SPECIMEN SOURCE: NORMAL

## 2022-09-21 DIAGNOSIS — G89.29 CHRONIC BILATERAL LOW BACK PAIN WITH BILATERAL SCIATICA: ICD-10-CM

## 2022-09-21 DIAGNOSIS — M54.41 CHRONIC BILATERAL LOW BACK PAIN WITH BILATERAL SCIATICA: ICD-10-CM

## 2022-09-21 DIAGNOSIS — M54.42 CHRONIC BILATERAL LOW BACK PAIN WITH BILATERAL SCIATICA: ICD-10-CM

## 2022-09-25 RX ORDER — CYCLOBENZAPRINE HCL 10 MG
TABLET ORAL
Qty: 30 TABLET | Refills: 1 | Status: SHIPPED | OUTPATIENT
Start: 2022-09-25 | End: 2022-11-30

## 2022-10-23 DIAGNOSIS — F43.21 GRIEF AT LOSS OF CHILD: ICD-10-CM

## 2022-10-23 DIAGNOSIS — Z63.4 GRIEF AT LOSS OF CHILD: ICD-10-CM

## 2022-10-23 DIAGNOSIS — E11.42 DIABETIC POLYNEUROPATHY ASSOCIATED WITH TYPE 2 DIABETES MELLITUS (HCC): ICD-10-CM

## 2022-10-23 DIAGNOSIS — M54.16 LUMBAR NERVE ROOT IMPINGEMENT: ICD-10-CM

## 2022-10-23 SDOH — SOCIAL STABILITY - SOCIAL INSECURITY: DISSAPEARANCE AND DEATH OF FAMILY MEMBER: Z63.4

## 2022-10-27 RX ORDER — GABAPENTIN 400 MG/1
CAPSULE ORAL
Qty: 270 CAPSULE | Refills: 0 | Status: SHIPPED | OUTPATIENT
Start: 2022-10-27 | End: 2022-12-16

## 2022-10-27 RX ORDER — BUPROPION HYDROCHLORIDE 150 MG/1
TABLET ORAL
Qty: 90 TABLET | Refills: 0 | Status: SHIPPED | OUTPATIENT
Start: 2022-10-27 | End: 2023-05-19

## 2022-11-08 ENCOUNTER — PATIENT MESSAGE (OUTPATIENT)
Dept: HEALTH INFORMATION MANAGEMENT | Facility: OTHER | Age: 67
End: 2022-11-08

## 2022-11-30 ENCOUNTER — OFFICE VISIT (OUTPATIENT)
Dept: MEDICAL GROUP | Facility: PHYSICIAN GROUP | Age: 67
End: 2022-11-30
Payer: MEDICARE

## 2022-11-30 VITALS
DIASTOLIC BLOOD PRESSURE: 66 MMHG | HEIGHT: 67 IN | OXYGEN SATURATION: 89 % | RESPIRATION RATE: 20 BRPM | HEART RATE: 88 BPM | WEIGHT: 248 LBS | BODY MASS INDEX: 38.92 KG/M2 | TEMPERATURE: 97 F | SYSTOLIC BLOOD PRESSURE: 100 MMHG

## 2022-11-30 DIAGNOSIS — G47.33 OSA ON CPAP: ICD-10-CM

## 2022-11-30 DIAGNOSIS — F41.9 ANXIETY: ICD-10-CM

## 2022-11-30 DIAGNOSIS — G47.00 INSOMNIA, UNSPECIFIED TYPE: ICD-10-CM

## 2022-11-30 DIAGNOSIS — F43.21 GRIEF AT LOSS OF CHILD: ICD-10-CM

## 2022-11-30 DIAGNOSIS — E11.65 TYPE 2 DIABETES MELLITUS WITH HYPERGLYCEMIA, WITH LONG-TERM CURRENT USE OF INSULIN (HCC): ICD-10-CM

## 2022-11-30 DIAGNOSIS — Z63.4 GRIEF AT LOSS OF CHILD: ICD-10-CM

## 2022-11-30 DIAGNOSIS — Z91.199 NON COMPLIANCE WITH MEDICAL TREATMENT: ICD-10-CM

## 2022-11-30 DIAGNOSIS — F41.0 PANIC ATTACKS: ICD-10-CM

## 2022-11-30 DIAGNOSIS — F32.9 REACTIVE DEPRESSION: ICD-10-CM

## 2022-11-30 DIAGNOSIS — Z79.4 TYPE 2 DIABETES MELLITUS WITH HYPERGLYCEMIA, WITH LONG-TERM CURRENT USE OF INSULIN (HCC): ICD-10-CM

## 2022-11-30 LAB
HBA1C MFR BLD: 10.2 % (ref 0–5.6)
INT CON NEG: NEGATIVE
INT CON POS: POSITIVE

## 2022-11-30 PROCEDURE — 99214 OFFICE O/P EST MOD 30 MIN: CPT | Performed by: STUDENT IN AN ORGANIZED HEALTH CARE EDUCATION/TRAINING PROGRAM

## 2022-11-30 PROCEDURE — 83036 HEMOGLOBIN GLYCOSYLATED A1C: CPT | Performed by: STUDENT IN AN ORGANIZED HEALTH CARE EDUCATION/TRAINING PROGRAM

## 2022-11-30 RX ORDER — ZOLPIDEM TARTRATE 5 MG/1
5 TABLET ORAL NIGHTLY PRN
Qty: 30 TABLET | Refills: 0 | Status: SHIPPED | OUTPATIENT
Start: 2022-11-30 | End: 2022-12-30

## 2022-11-30 RX ORDER — HYDROXYZINE HYDROCHLORIDE 25 MG/1
25-50 TABLET, FILM COATED ORAL EVERY 6 HOURS PRN
Qty: 90 TABLET | Refills: 1 | Status: SHIPPED | OUTPATIENT
Start: 2022-11-30 | End: 2023-05-19

## 2022-11-30 SDOH — SOCIAL STABILITY - SOCIAL INSECURITY: DISSAPEARANCE AND DEATH OF FAMILY MEMBER: Z63.4

## 2022-11-30 ASSESSMENT — FIBROSIS 4 INDEX: FIB4 SCORE: 1.12

## 2022-11-30 NOTE — PROGRESS NOTES
Subjective:     Chief Complaint   Patient presents with    Sleep Disruption    Anxiety     HPI:   Justin presents today to discuss anxiety and sleep disturbances.    Patient reports that his wife was diagnosed with renal cancer.  He has a chronic history of anxiety and depression as well as insomnia after losing a child years ago.  Previously was on zolpidem 10 mg and requesting refill.  He is also requesting a refill of lorazepam that was previously prescribed by his PCP.  Denies history of psychiatry evaluation.  Has not taken lorazepam in some time.  Previously was on sertraline and states that in the past his provider placed him on 150 mg which was helpful for depression after losing a child.  He had some leftover at home so has started back at that 150 mg dose and has not noticed a benefit yet but its only been recent and denies any adverse side effects.    Patient endorsing panic attacks that lasts typically no more than 2 to 5 minutes.    Current Outpatient Medications Ordered in Epic   Medication Sig Dispense Refill    hydrOXYzine HCl (ATARAX) 25 MG Tab Take 1-2 Tablets by mouth every 6 hours as needed for Anxiety. 90 Tablet 1    zolpidem (AMBIEN) 5 MG Tab Take 1 Tablet by mouth at bedtime as needed for Sleep for up to 30 days. 30 Tablet 0    gabapentin (NEURONTIN) 400 MG Cap TAKE 1 CAPSULE BY MOUTH THREE TIMES DAILY. TAKE 1 CAPSULE PLUS 800MG CAPSULE THREE TIMES DAILY FOR TOTAL DAILY DOSE OF 3600MG PER  Capsule 0    buPROPion (WELLBUTRIN XL) 150 MG XL tablet TAKE 1 TABLET(150 MG) BY MOUTH DAILY 90 Tablet 0    insulin glargine (LANTUS SOLOSTAR) 100 UNIT/ML Solution Pen-injector injection Inject 70 Units under the skin every evening. 3 mL 10    atorvastatin (LIPITOR) 40 MG Tab Take 1 Tablet by mouth every evening. 90 Tablet 3    lisinopril (PRINIVIL) 5 MG Tab Take 1 Tablet by mouth every day. 90 Tablet 3    HUMALOG KWIKPEN 100 UNIT/ML Solution Pen-injector injection PEN Inject 10-20 Units under the skin  "3 times a day before meals. 6 Each 6    sertraline (ZOLOFT) 50 MG Tab Take 150 mg by mouth every day.      meloxicam (MOBIC) 7.5 MG Tab Take 1-2 Tablets by mouth every day. 30 Tablet 0    glimepiride (AMARYL) 2 MG Tab Take 4 mg by mouth every morning.      metformin (GLUCOPHAGE) 1000 MG tablet Take 1,000 mg by mouth 2 times a day.      buPROPion (WELLBUTRIN XL) 300 MG XL tablet Take 300 mg by mouth every day.      gabapentin (NEURONTIN) 800 MG tablet Take 800 mg by mouth 3 times a day.       No current [facility]-ordered facility-administered medications on file.     ROS:  Gen: no fevers/chills, no changes in weight  Eyes: no changes in vision  ENT: no sore throat  Pulm: no sob, no cough  CV: no chest pain, no palpitations  GI: no nausea/vomiting, no diarrhea  Skin: no rash  Neuro: no headaches  Heme/Lymph: no easy bruising    Objective:     Exam:  /66 (BP Location: Right arm, Patient Position: Sitting, BP Cuff Size: Adult)   Pulse 88   Temp 36.1 °C (97 °F) (Temporal)   Resp 20   Ht 1.702 m (5' 7\")   Wt 112 kg (248 lb)   SpO2 89%   BMI 38.84 kg/m²  Body mass index is 38.84 kg/m².    Physical Exam:  Constitutional: Well-developed and well-nourished. No acute distress.   Skin: Skin is warm and dry. No rash noted.  Head: Atraumatic without lesions.  Eyes: Conjunctivae and extraocular motions are normal. Pupils are equal, round. No scleral icterus.   Mouth/Throat: Wearing mask.  Neck: Supple, trachea midline.  Lungs: Normal inspiratory effort  Extremities: No cyanosis, clubbing, erythema, nor edema.  Neurological: Alert and oriented x 3. Normal gait.  Psychiatric:    Appearance: Clothing and grooming normal.  Mood: I need to sleep  Behavior: No psychomotor abnormalities or impulsivity. Attention is good. Patient is pleasant and cooperative.   Eye contact: good   Affect: normal and mood-congruent  Speech/thought content: Normal speech pattern. Normal insight and reasoning, no evidence of psychotic process. Denies " suicidal or homicidal thoughts.     Assessment & Plan:     67 y.o. male with the following -     1. Anxiety  - Referral to Behavioral Health  - hydrOXYzine HCl (ATARAX) 25 MG Tab; Take 1-2 Tablets by mouth every 6 hours as needed for Anxiety.  Dispense: 90 Tablet; Refill: 1  2. Panic attacks  - Referral to Behavioral Health  - hydrOXYzine HCl (ATARAX) 25 MG Tab; Take 1-2 Tablets by mouth every 6 hours as needed for Anxiety.  Dispense: 90 Tablet; Refill: 1  Acute on chronic.  Previously prescribed lorazepam.  Discussed possible risk of that medication and that we should consider an alternative so provided with hydroxyzine as above to take as needed for episodes of severe anxiety.  Patient starting Zoloft back at 150 mg and declines titration up as we discussed is typical rather than starting at a higher dose.  Referred to behavioral health.    3. Insomnia, unspecified type  This is a chronic condition, worsened in the setting of recent stressors with his wife's cancer diagnosis.  Previously was taking Ambien 10 mg daily.  Declines any alternatives as he prefers to take what previously worked and was tolerated in the past.  Discuss black box warning on this medication, patient understands its a controlled substance and UDS obtained today.  We will trial him at a lower dose as we discussed given his age would be recommended over the high 10 mg dose.  - PAIN MANAGEMENT SCRN, W/ RFLX TO QNT; Future  - Controlled Substance Treatment Agreement  - zolpidem (AMBIEN) 5 MG Tab; Take 1 Tablet by mouth at bedtime as needed for Sleep for up to 30 days.  Dispense: 30 Tablet; Refill: 0    4. Reactive depression  5. Grief at loss of child  Chronic history, has recently self started Zoloft 150 mg daily.  It is his preference to start at that dose rather than tapering up.  No current safety concerns.  Referred to behavioral health, I suspect he would benefit both from psychology and psychiatry evaluation and treatment.  - Referral to  Behavioral Health    6. Type 2 diabetes mellitus with hyperglycemia, with long-term current use of insulin (HCC)  7. Non compliance with medical treatment  This is a chronic condition, improved from prior but still very poorly controlled.  Previously referred to pharmacy to help with cost of medications as well as improving glycemic control but never followed up.  Patient continues with Lantus 70 units at night but admits that he has not been using Humalog.  Continues with metformin 1000 mg twice daily as well as glimepiride 2 mg in the morning.  Patient reports that he will resume his Humalog as previously recommended before every meal.  In the past we had worked on seeing if he would qualify for [employer] but he never completed and returned the application-declines another application today.  A1c in 3 months.  - POCT  A1C  - Referral to Pharmacotherapy Service    8. ELLIOT on CPAP  Patient has not established with pulmonary medicine, referred.  - Referral to Pulmonary and Sleep Medicine    Return in about 4 weeks (around 12/28/2022), or if symptoms worsen or fail to improve, for Diabetes.    Please note that this dictation was created using voice recognition software. I have made every reasonable attempt to correct obvious errors, but I expect that there are errors of grammar and possibly content that I did not discover before finalizing the note.

## 2022-11-30 NOTE — PATIENT INSTRUCTIONS
Ideal glucose ranges: fasting , random (1-2 hours after eating) <180. If glucose <80, we need to adjust your medication to prevent continuing low blood sugar.    Ideal blood pressure <130/80 and at least <140/90.

## 2022-12-09 ENCOUNTER — TELEPHONE (OUTPATIENT)
Dept: HEALTH INFORMATION MANAGEMENT | Facility: OTHER | Age: 67
End: 2022-12-09
Payer: MEDICARE

## 2022-12-15 ENCOUNTER — NON-PROVIDER VISIT (OUTPATIENT)
Dept: MEDICAL GROUP | Facility: PHYSICIAN GROUP | Age: 67
End: 2022-12-15
Payer: MEDICARE

## 2022-12-15 DIAGNOSIS — Z79.4 TYPE 2 DIABETES MELLITUS WITH HYPERGLYCEMIA, WITH LONG-TERM CURRENT USE OF INSULIN (HCC): ICD-10-CM

## 2022-12-15 DIAGNOSIS — E11.65 TYPE 2 DIABETES MELLITUS WITH HYPERGLYCEMIA, WITH LONG-TERM CURRENT USE OF INSULIN (HCC): ICD-10-CM

## 2022-12-15 DIAGNOSIS — M54.41 CHRONIC BILATERAL LOW BACK PAIN WITH BILATERAL SCIATICA: ICD-10-CM

## 2022-12-15 DIAGNOSIS — M54.42 CHRONIC BILATERAL LOW BACK PAIN WITH BILATERAL SCIATICA: ICD-10-CM

## 2022-12-15 DIAGNOSIS — G89.29 CHRONIC BILATERAL LOW BACK PAIN WITH BILATERAL SCIATICA: ICD-10-CM

## 2022-12-15 PROCEDURE — 99402 PREV MED CNSL INDIV APPRX 30: CPT | Performed by: INTERNAL MEDICINE

## 2022-12-15 NOTE — PROGRESS NOTES
Patient Consult Note     TIME IN: 305  TIME OUT: 331  Time with Pt 36    Primary care physician: Juany Garcia D.O.    Reason for consult: Management of Uncontrolled Type 2 Diabetes    HPI:  Justin Gil is a 67 y.o. old patient who comes in today for evaluation of above stated problem.    Allergies:  Patient has no known allergies.    Most Recent labs, A1c, and immunizations:    Lab Results   Component Value Date/Time    HBA1C 10.2 (A) 11/30/2022 01:50 PM          Lab Results   Component Value Date/Time    CHOLSTRLTOT 226 (H) 09/20/2021 08:38 AM    LDL see below 09/20/2021 08:38 AM    HDL 40 09/20/2021 08:38 AM    TRIGLYCERIDE 637 (H) 09/20/2021 08:38 AM       Lab Results   Component Value Date/Time    SODIUM 134 (L) 09/20/2021 08:38 AM    POTASSIUM 3.8 09/20/2021 08:38 AM    CHLORIDE 99 09/20/2021 08:38 AM    CO2 22 09/20/2021 08:38 AM    GLUCOSE 233 (H) 09/20/2021 08:38 AM    BUN 21 09/20/2021 08:38 AM    CREATININE 0.81 09/20/2021 08:38 AM     Lab Results   Component Value Date/Time    ALKPHOSPHAT 112 (H) 09/20/2021 08:38 AM    ASTSGOT 26 09/20/2021 08:38 AM    ALTSGPT 42 09/20/2021 08:38 AM    TBILIRUBIN 0.3 09/20/2021 08:38 AM    ALBUMIN 4.0 09/20/2021 08:38 AM      Lab Results   Component Value Date/Time    MALBCRT see below 09/21/2021 12:00 PM    MICROALBUR <1.2 09/21/2021 12:00 PM         Immunization History   Administered Date(s) Administered    Influenza Vac Subunit Quad Inj (Pf) 12/14/2017    Influenza Vaccine Quad Inj (Pf) 10/07/2020    Influenza, Unspecified - HISTORICAL DATA 09/26/2019    Pfizer SARS-CoV-2 Vaccine 12+ 03/23/2021, 04/13/2021    Pneumococcal Conjugate Vaccine (Prevnar/PCV-13) 09/26/2019    Tdap Vaccine 03/06/2012         Medications:    Current Outpatient Medications:     hydrOXYzine HCl, 25-50 mg, Oral, Q6HRS PRN    zolpidem, 5 mg, Oral, HS PRN    gabapentin, TAKE 1 CAPSULE BY MOUTH THREE TIMES DAILY. TAKE 1 CAPSULE PLUS 800MG CAPSULE THREE TIMES DAILY FOR TOTAL DAILY DOSE OF  3600MG PER DAY    buPROPion, TAKE 1 TABLET(150 MG) BY MOUTH DAILY    Lantus SoloStar, 70 Units, Subcutaneous, Q EVENING    atorvastatin, 40 mg, Oral, Nightly    lisinopril, 5 mg, Oral, DAILY    HumaLOG KwikPen, 10-20 Units, Subcutaneous, TID AC    sertraline, 150 mg, Oral, DAILY    meloxicam, 7.5-15 mg, Oral, DAILY    glimepiride, 4 mg, Oral, QAM    metformin, 1,000 mg, Oral, BID    buPROPion, 300 mg, Oral, DAILY    gabapentin, 800 mg, Oral, TID    Diabetes Medication Current Regimen:  Metformin 1000 twice a day  Glimepiride 4 mg once daily  Lantus 74 units every HS  Not using Humalog     History/ Failed Diabetic Medications if applicable:  n/a    Preventative Management  BP regimen (ACE/ARB) - lisinopril   ASA - no  Statin - none  Last Retinal Scan - will get it done  Monofilament/foot exam - neuropathy     Pt has home glucometer and proper testing technique - yes  Pt reports blood sugars:   Before Breakfast: 180  Before Lunch:   Before Dinner:   Before Bedtime:   Other times:     Hypoglycemia awareness - yes  Nocturnal hypoglycemia- no  Hypoglycemia:  None    Physical Examination:  Vital signs: There were no vitals taken for this visit. There is no height or weight on file to calculate BMI.    Change in weight: Stable  Exercise habits: minimal exercise   Diet: common adult    Assessment and Plan:  1. DM2   a1c above goal   We will restart his Humalog  patient assistant paperwork for Jardiance and Trulicity provided to the patient  At subsequent appointments hopefully we will be able to reduce Lantus/Humalog   May benefit from Actos in the future-but worry about possible weight gain, limit dose to 30 mg max.,  No diagnosis of congestive heart failure.  Medication(s) recommended:   Metformin 1000 twice a day  Stop Glimepiride 4 mg once daily  Lantus 75 units at bedtime  Restart Humalog at approximately 15 units and titrate up to 30 units twice daily as tolerated in the past  -Start Jardiance 10 mg once daily via  patient assistance (added to patient's medication list once taking)  -Trulicity 0.75 mg every 7 days via patient assistance (added to medication list once patient is taking)    Long-acting insulin:   Adjust dose according to FASTING BLOOD GLUCOSE target  mg/dL  (1) Increase the insulin dose every 3-4 days as needed.   (2) Increase by 2 units if FBG average concentration is 131-170 mg/dL.   (3) Increase by 4 units if FBG average concentration is 171-210 mg/dL.   (4) Increase by 6 units if FBG average concentration is 221-260 mg/dL.   (5) Increase by 8 units if FBG average concentration is greater than 261mg/dL and call us.     Consider cutting back by 1-2 units to previous dose if glucose concentration is below 80 mg/dL or symptoms of hypoglycemia.     Correctional insulin dosing  Glucose reading  Change to be made to insulin dose    < 80 mg/dL  Subtract 1 unit     mg/dL  15 units     126-170 mg/dL  Add 1 unit    171-215 mg/dL  Add 2 units    216-260 mg/dL  Add 3 units       -Blood glucose and A1c target    However, more aggressive diabetic control is reasonable when tolerated.  Pt's treatment of Hypoglycemia. 15:15 Rule discussed with patient      2.  Cardiovascular  Triglycerides above goal  Likely benefit from a fibrate or fish oil  We will get updated labs  Medication(s) recommended.   n/a      3.  Lifestyle changes   Focus on eating a DASH/Mediterranean-style diet.   Exercise 30 minutes daily at least 5 days/week, as tolerated.  https://www.niddk.nih.gov/bwp    Follow-up appointment in 8 week(s)    Jw Quesada, PharmD, MS, BCACP, C  [facility]  Phone [phone] fax [phone]    This note was created using voice recognition software (Dragon). The accuracy of the dictation is limited by the abilities of the software. I have reviewed the note prior to signing, however some errors in grammar and context are still possible. If you have any questions related  to this note please do not hesitate to contact our office.     CC:   Juany Garcia D.O.  No ref. provider found  Dr. Gene Oviedo

## 2022-12-15 NOTE — PATIENT INSTRUCTIONS
Long-acting insulin:   Adjust dose according to FASTING BLOOD GLUCOSE target  mg/dL  (1) Increase the insulin dose every 3-4 days as needed.   (2) Increase by 2 units if FBG average concentration is 131-170 mg/dL.   (3) Increase by 4 units if FBG average concentration is 171-210 mg/dL.   (4) Increase by 6 units if FBG average concentration is 221-260 mg/dL.   (5) Increase by 8 units if FBG average concentration is greater than 261mg/dL and call us.     Consider cutting back by 1-2 units to previous dose if glucose concentration is below 80 mg/dL or symptoms of hypoglycemia.     Correctional insulin dosing  Glucose reading  Change to be made to insulin dose    < 80 mg/dL  Subtract 1 unit     mg/dL  Usual premeal dose   (fixed or based on carbohydrate intake)    126-170 mg/dL  Add 1 unit    171-215 mg/dL  Add 2 units    216-260 mg/dL  Add 3 units

## 2022-12-16 RX ORDER — GABAPENTIN 800 MG/1
800 TABLET ORAL 3 TIMES DAILY
Qty: 270 TABLET | Refills: 0 | Status: SHIPPED | OUTPATIENT
Start: 2022-12-16

## 2022-12-30 ENCOUNTER — OFFICE VISIT (OUTPATIENT)
Dept: MEDICAL GROUP | Facility: PHYSICIAN GROUP | Age: 67
End: 2022-12-30
Payer: MEDICARE

## 2022-12-30 VITALS
WEIGHT: 253 LBS | HEIGHT: 68 IN | TEMPERATURE: 96.9 F | SYSTOLIC BLOOD PRESSURE: 118 MMHG | DIASTOLIC BLOOD PRESSURE: 78 MMHG | RESPIRATION RATE: 16 BRPM | BODY MASS INDEX: 38.34 KG/M2 | HEART RATE: 84 BPM | OXYGEN SATURATION: 92 %

## 2022-12-30 DIAGNOSIS — F32.9 REACTIVE DEPRESSION: ICD-10-CM

## 2022-12-30 DIAGNOSIS — E11.65 TYPE 2 DIABETES MELLITUS WITH HYPERGLYCEMIA, WITH LONG-TERM CURRENT USE OF INSULIN (HCC): ICD-10-CM

## 2022-12-30 DIAGNOSIS — F41.0 PANIC ATTACKS: ICD-10-CM

## 2022-12-30 DIAGNOSIS — G47.00 INSOMNIA, UNSPECIFIED TYPE: ICD-10-CM

## 2022-12-30 DIAGNOSIS — Z79.4 TYPE 2 DIABETES MELLITUS WITH HYPERGLYCEMIA, WITH LONG-TERM CURRENT USE OF INSULIN (HCC): ICD-10-CM

## 2022-12-30 PROCEDURE — 99214 OFFICE O/P EST MOD 30 MIN: CPT | Performed by: STUDENT IN AN ORGANIZED HEALTH CARE EDUCATION/TRAINING PROGRAM

## 2022-12-30 RX ORDER — ZOLPIDEM TARTRATE 10 MG/1
10 TABLET ORAL NIGHTLY PRN
Qty: 30 TABLET | Refills: 2 | Status: SHIPPED | OUTPATIENT
Start: 2022-12-30 | End: 2023-03-30

## 2022-12-30 ASSESSMENT — PATIENT HEALTH QUESTIONNAIRE - PHQ9
3. TROUBLE FALLING OR STAYING ASLEEP OR SLEEPING TOO MUCH: NEARLY EVERY DAY
SUM OF ALL RESPONSES TO PHQ QUESTIONS 1-9: 9
2. FEELING DOWN, DEPRESSED, IRRITABLE, OR HOPELESS: NEARLY EVERY DAY
7. TROUBLE CONCENTRATING ON THINGS, SUCH AS READING THE NEWSPAPER OR WATCHING TELEVISION: NOT AT ALL
8. MOVING OR SPEAKING SO SLOWLY THAT OTHER PEOPLE COULD HAVE NOTICED. OR THE OPPOSITE, BEING SO FIGETY OR RESTLESS THAT YOU HAVE BEEN MOVING AROUND A LOT MORE THAN USUAL: NOT AT ALL
6. FEELING BAD ABOUT YOURSELF - OR THAT YOU ARE A FAILURE OR HAVE LET YOURSELF OR YOUR FAMILY DOWN: NOT AL ALL
9. THOUGHTS THAT YOU WOULD BE BETTER OFF DEAD, OR OF HURTING YOURSELF: NOT AT ALL
5. POOR APPETITE OR OVEREATING: NOT AT ALL
SUM OF ALL RESPONSES TO PHQ9 QUESTIONS 1 AND 2: 3
1. LITTLE INTEREST OR PLEASURE IN DOING THINGS: NOT AT ALL
4. FEELING TIRED OR HAVING LITTLE ENERGY: NEARLY EVERY DAY

## 2022-12-30 ASSESSMENT — FIBROSIS 4 INDEX: FIB4 SCORE: 1.12

## 2022-12-30 NOTE — PROGRESS NOTES
Subjective:     Chief Complaint   Patient presents with    Diabetes Follow-up     HPI:   Justin presents today for follow up.    Patient met with pharmacist and has implemented the changes.    Not sleeping as well on 5mg Ambien, did well prior with 10mg QHS. Still having anxiety and panic related to his wife's cancer. Depression stable without SI/HI. Bought his own CPAP mask, works well.    Current Outpatient Medications Ordered in Epic   Medication Sig Dispense Refill    zolpidem (AMBIEN) 10 MG Tab Take 1 Tablet by mouth at bedtime as needed for Sleep for up to 90 days. 30 Tablet 2    metformin (GLUCOPHAGE) 1000 MG tablet Take 1 Tablet by mouth 2 times a day. 180 Tablet 0    gabapentin (NEURONTIN) 800 MG tablet Take 1 Tablet by mouth 3 times a day. 270 Tablet 0    hydrOXYzine HCl (ATARAX) 25 MG Tab Take 1-2 Tablets by mouth every 6 hours as needed for Anxiety. 90 Tablet 1    buPROPion (WELLBUTRIN XL) 150 MG XL tablet TAKE 1 TABLET(150 MG) BY MOUTH DAILY 90 Tablet 0    insulin glargine (LANTUS SOLOSTAR) 100 UNIT/ML Solution Pen-injector injection Inject 70 Units under the skin every evening. 3 mL 10    atorvastatin (LIPITOR) 40 MG Tab Take 1 Tablet by mouth every evening. 90 Tablet 3    lisinopril (PRINIVIL) 5 MG Tab Take 1 Tablet by mouth every day. 90 Tablet 3    HUMALOG KWIKPEN 100 UNIT/ML Solution Pen-injector injection PEN Inject 10-20 Units under the skin 3 times a day before meals. (Patient taking differently: Inject 30 Units under the skin 2 times a day.) 6 Each 6    sertraline (ZOLOFT) 50 MG Tab Take 150 mg by mouth every day.      meloxicam (MOBIC) 7.5 MG Tab Take 1-2 Tablets by mouth every day. 30 Tablet 0    buPROPion (WELLBUTRIN XL) 300 MG XL tablet Take 300 mg by mouth every day.       No current [facility]-ordered facility-administered medications on file.     ROS:  Gen: no fevers/chills, no changes in weight  Eyes: no changes in vision  ENT: no sore throat  Pulm: no sob, no cough  CV: no chest pain, no  "palpitations  GI: no nausea/vomiting, no diarrhea  MSk: no myalgias  Skin: no rash  Neuro: no headaches  Heme/Lymph: no easy bruising    Objective:     Exam:  /78 (BP Location: Left arm, Patient Position: Sitting, BP Cuff Size: Adult)   Pulse 84   Temp 36.1 °C (96.9 °F) (Temporal)   Resp 16   Ht 1.727 m (5' 8\")   Wt 115 kg (253 lb)   SpO2 92%   BMI 38.47 kg/m²  Body mass index is 38.47 kg/m².    Physical Exam:  Constitutional: Well-developed and well-nourished. No acute distress.   Skin: Skin is warm and dry. No rash noted.  Head: Atraumatic without lesions.  Eyes: Conjunctivae and extraocular motions are normal. Pupils are equal, round. No scleral icterus.   Mouth/Throat: Wearing mask.  Neck: Supple, trachea midline.   Cardiovascular: Regular rate and rhythm, S1 and S2 without murmur, rubs, or gallops.  Lungs: Normal inspiratory effort,  Extremities: No cyanosis, clubbing, erythema, nor edema.  Neurological: Alert and oriented x 3. No gross/focal deficits.  Psychiatric:   Appearance: Clothing and grooming normal.  Mood: better  Behavior: No psychomotor abnormalities or impulsivity. Attention is good. Patient is pleasant and cooperative.   Eye contact: good   Affect: normal  Speech/thought content: Normal speech pattern. Normal insight and reasoning, no evidence of psychotic process. Denies suicidal or homicidal thoughts.    Assessment & Plan:     67 y.o. male with the following -     1. Insomnia, unspecified type  Chronic, not as well controlled. For now will increase back to 10mg QHS prn, aware of boxed warning and declines trial of alternative at this time. PDMP reviewed.  - zolpidem (AMBIEN) 10 MG Tab; Take 1 Tablet by mouth at bedtime as needed for Sleep for up to 90 days.  Dispense: 30 Tablet; Refill: 2    2. Reactive depression  3. Panic attacks  Acute on chronic recently with his wife's cancer diagnosis. Improved. He will continue zoloft 150mg daily and wellbutrin as before. Atarax 25mg prn Q6. " [facility]    4. Type 2 diabetes mellitus with hyperglycemia, with long-term current use of insulin (HCC)  Chronic, poorly controlled due to non compliance but improved. Reviewed pharmacist encounter. Continue follow up with pharmacist and his recommendations.   - metformin (GLUCOPHAGE) 1000 MG tablet; Take 1 Tablet by mouth 2 times a day.  Dispense: 180 Tablet; Refill: 0    Return in about 4 weeks (around 1/27/2023), or if symptoms worsen or fail to improve.    Please note that this dictation was created using voice recognition software. I have made every reasonable attempt to correct obvious errors, but I expect that there are errors of grammar and possibly content that I did not discover before finalizing the note.

## 2022-12-30 NOTE — PATIENT INSTRUCTIONS
[facility]  [address], [address]  [place] [address]  Phone: [phone]  Fax: [phone]    Fasting labs due any time    Ideal glucose ranges: fasting , random (1-2 hours after eating) <180. If glucose <70, we need to adjust your medication to prevent continuing low blood sugar.    Ideal blood pressure <130/80 and at least <140/90.  Can divide lantus to twice a day if >80units per day  Long-acting insulin:   Adjust dose according to FASTING BLOOD GLUCOSE target  mg/dL  (1) Increase the insulin dose every 3-4 days as needed.   (2) Increase by 2 units if FBG average concentration is 131-170 mg/dL.   (3) Increase by 4 units if FBG average concentration is 171-210 mg/dL.   (4) Increase by 6 units if FBG average concentration is 221-260 mg/dL.   (5) Increase by 8 units if FBG average concentration is greater than 261mg/dL and call us.      Consider cutting back by 1-2 units to previous dose if glucose concentration is below 80 mg/dL or symptoms of hypoglycemia.

## 2023-01-09 NOTE — PROGRESS NOTES
CC: Reevaluation of ELLIOT on CPAP    HPI:  Justin Gil is a 68 y.o. male kindly referred by Juany Garcia D.O.    Past medical history significant for anxiety, panic attacks, insomnia, depression, type 2 diabetes, ELLIOT on CPAP    Schedule:    Bed partner: yes  To bed:  Sleep Latency:  Up:   Work Hours:  Naps:  Caffeine:  Etoh:  Total [place] score:      Symptom Summary:    Sleep onset insomnia: +  Nocturnal awakenings: +  Nocturia: +  Trouble falling back asleep after awakening: +  Snoring:  +  Witnessed apneas: +  Resuscitative snorts: +  Nocturnal shortness of breath: +  Nocturnal headaches: +  Limb movements during sleep:   Restless legs: +  Non-restorative sleep: +  Morning headaches: +  EDS: +  Fatigue: +  Tiredness: +  Falls asleep accidentally: +  Returning to bed after arising: +  Irritability: +  Memory problems: +  Difficulty concentrating:+  Work performance problems:+            Patient Active Problem List    Diagnosis Date Noted    ELLIOT on CPAP 11/30/2022    Anxiety 11/30/2022    Panic attacks 11/30/2022    Reactive depression 11/30/2022    Insomnia 11/30/2022    Non compliance with medical treatment 11/30/2022    Chronic bilateral low back pain with bilateral sciatica 02/17/2022    Class 3 severe obesity with serious comorbidity and body mass index (BMI) of 40.0 to 44.9 in adult ([facility]) 02/17/2022    Lumbar stenosis 02/09/2022    Lumbar nerve root impingement 02/09/2022    Hypertension associated with type 2 diabetes mellitus ([facility]) 09/17/2021    Arthritis of left wrist 09/16/2021    Grief at loss of child 09/16/2021    Diabetic polyneuropathy associated with type 2 diabetes mellitus ([facility]) 09/16/2021    Former cigarette smoker 09/16/2021    Shortness of breath 09/16/2021    Vaping nicotine dependence, tobacco product 09/16/2021    Type 2 diabetes mellitus with hyperglycemia, with long-term current use of insulin ([facility]) 09/16/2021    Dyslipidemia associated with type 2 diabetes mellitus ([facility])  2021    Bronchitis 2017    Bilateral bunions 2014    Allergic rhinitis 2014    Lower leg pain 2013    History of colonic polyps 2012    Acquired clawfoot 2012    Asthma 2010    Lumbosacral ligament sprain 05/10/2010       Past Medical History:   Diagnosis Date    PNA (pneumonia)     with septic shock per patient        Past Surgical History:   Procedure Laterality Date    BRONCHOSCOPY      UMBILICAL HERNIA REPAIR      at 30ish       Family History   Problem Relation Age of Onset    Heart Disease Mother          at 84    COPD Mother     COPD Father          at 72    Cancer Father         renal at 71    COPD Sister     Colon Cancer Neg Hx        Social History     Socioeconomic History    Marital status:      Spouse name: Not on file    Number of children: Not on file    Years of education: Not on file    Highest education level: Not on file   Occupational History    Not on file   Tobacco Use    Smoking status: Former     Packs/day: 2.00     Years: 60.00     Pack years: 120.00     Types: Cigarettes     Quit date: 2014     Years since quittin.3    Smokeless tobacco: Never   Vaping Use    Vaping Use: Former    Substances: Nicotine    Devices: Pre-filled or refillable cartridge, Refillable tank   Substance and Sexual Activity    Alcohol use: Never    Drug use: Never    Sexual activity: Not on file   Other Topics Concern    Not on file   Social History Narrative    Not on file     Social Determinants of Health     Financial Resource Strain: Not on file   Food Insecurity: Not on file   Transportation Needs: Not on file   Physical Activity: Not on file   Stress: Not on file   Social Connections: Not on file   Intimate Partner Violence: Not on file   Housing Stability: Not on file       Current Outpatient Medications   Medication Sig Dispense Refill    zolpidem (AMBIEN) 10 MG Tab Take 1 Tablet by mouth at bedtime as needed for Sleep for up to 90  "days. 30 Tablet 2    metformin (GLUCOPHAGE) 1000 MG tablet Take 1 Tablet by mouth 2 times a day. 180 Tablet 0    gabapentin (NEURONTIN) 800 MG tablet Take 1 Tablet by mouth 3 times a day. 270 Tablet 0    hydrOXYzine HCl (ATARAX) 25 MG Tab Take 1-2 Tablets by mouth every 6 hours as needed for Anxiety. 90 Tablet 1    buPROPion (WELLBUTRIN XL) 150 MG XL tablet TAKE 1 TABLET(150 MG) BY MOUTH DAILY 90 Tablet 0    insulin glargine (LANTUS SOLOSTAR) 100 UNIT/ML Solution Pen-injector injection Inject 70 Units under the skin every evening. 3 mL 10    atorvastatin (LIPITOR) 40 MG Tab Take 1 Tablet by mouth every evening. 90 Tablet 3    lisinopril (PRINIVIL) 5 MG Tab Take 1 Tablet by mouth every day. 90 Tablet 3    HUMALOG KWIKPEN 100 UNIT/ML Solution Pen-injector injection PEN Inject 10-20 Units under the skin 3 times a day before meals. (Patient taking differently: Inject 30 Units under the skin 2 times a day.) 6 Each 6    sertraline (ZOLOFT) 50 MG Tab Take 150 mg by mouth every day.      meloxicam (MOBIC) 7.5 MG Tab Take 1-2 Tablets by mouth every day. 30 Tablet 0    buPROPion (WELLBUTRIN XL) 300 MG XL tablet Take 300 mg by mouth every day.       No current facility-administered medications for this visit.    \"CURRENT RX\"    ALLERGIES: Patient has no known allergies.    ROS  ***  Constitutional: Denies fever, chills, sweats,  weight loss, fatigue.  Eyes: Denies vision loss, pain, drainage, double vision, glasses.  Ears/Nose/Mouth/Throat: Denies earache, difficulty hearing, rhinitis/nasal congestion, injury, recurrent sore throat, persistent hoarseness, decayed teeth/toothaches, ringing or buzzing in the ears.  Cardiovascular: Denies chest pain, tightness, palpitations, swelling in legs/feet, fainting, difficulty breathing when lying down but gets better when sitting up.   Respiratory: Denies shortness of breath, cough, sputum, wheezing, painful breathing, coughing up blood.   Sleep: per HPI  Gastrointestinal: Denies  " difficulty swallowing, nausea, abdominal pain, diarrhea, constipation, heartburn.  Genitourinary: Denies  blood in urine, discharge, frequent urination.   Musculoskeletal: Denies painful joints, sore muscles, back pain.   Integumentary: Denies rashes, lumps, color changes.   Neurological: Denies frequent headaches,weakness, dizziness.    PHYSICAL EXAM  ***    There were no vitals taken for this visit.  Appearance: Well-nourished, well-developed, no acute distress  Eyes:  PERRLA, EOMI  ENMT: masked  Neck: Supple, trachea midline, no masses  Respiratory effort:  No intercostal retractions or use of accessory muscles  Lung auscultation:  No wheezes rhonchi rubs or rales  Cardiac: No murmurs, rubs, or gallops; regular rhythm, normal rate; no edema  Abdomen:  No tenderness, no organomegaly  Musculoskeletal:  Grossly normal; gait and station normal; digits and nails normal  Skin:  No rashes, petechiae, cyanosis  Neurologic: without focal signs; oriented to person, time, place, and purpose; judgement intact  Psychiatric:  No depression, anxiety, agitation        Medical Decision Making:  The medical record was reviewed in its entirety including the referral notes, records from primary care, consultants notes, hospital records, labs, imaging, microbiology, immunology, and immunizations.  Care gaps identified and reviewed with the patient.    Diagnostic and titration nocturnal polysomnograms, home sleep apnea tests, continuous nocturnal oximetry results, multiple sleep latency tests, and compliance reports reviewed.        There are no diagnoses linked to this encounter.    PLAN:   The patient has signs and symptoms consistent with obstructive sleep apnea hypopnea syndrome. Will schedule a nocturnal polysomnogram or a home sleep apnea test depending on signs and symptoms as well as insurance restrictions.    The risks of untreated sleep apnea were discussed with the patient at length. Patients with ELLIOT are at increased risk  of cardiovascular disease including systemic arterial hypertension, pulmonary arterial hypertension (transient or fixed), TIA, and an elevated risk of stroke, heart attack, sudden death, or arrhythmia between the hours of 11 PM and 6 AM. ELLIOT patients have an increased risk of motor vehicle accidents, type 2 diabetes, GERD, repetitive mechanical trauma to pharyngeal muscles with inflammation and denervation, frequent EEG arousals leading to nonrestorative sleep, excessive daytime sleepiness, fatigue, depression, poor pain control, irritability, and lower quality of life.  The patient was advised to avoid driving a motor vehicle when drowsy.    Positive airway pressure will favorably impact many of the adverse conditions and effects provoked by ELLIOT.    Have advised the patient to follow up with the appropriate healthcare practitioners for all other medical problems and issues. Discussed blood pressure management if needed. Discussed depression screening.          ***  No follow-ups on file.    Total time 45 minutes

## 2023-01-10 ENCOUNTER — OFFICE VISIT (OUTPATIENT)
Dept: SLEEP MEDICINE | Facility: MEDICAL CENTER | Age: 68
End: 2023-01-10
Attending: STUDENT IN AN ORGANIZED HEALTH CARE EDUCATION/TRAINING PROGRAM
Payer: MEDICARE

## 2023-03-06 ENCOUNTER — TELEPHONE (OUTPATIENT)
Dept: VASCULAR LAB | Facility: MEDICAL CENTER | Age: 68
End: 2023-03-06
Payer: MEDICARE

## 2023-03-10 ENCOUNTER — APPOINTMENT (OUTPATIENT)
Dept: SLEEP MEDICINE | Facility: MEDICAL CENTER | Age: 68
End: 2023-03-10
Attending: STUDENT IN AN ORGANIZED HEALTH CARE EDUCATION/TRAINING PROGRAM
Payer: MEDICARE

## 2023-03-20 ENCOUNTER — TELEPHONE (OUTPATIENT)
Dept: VASCULAR LAB | Facility: MEDICAL CENTER | Age: 68
End: 2023-03-20
Payer: MEDICARE

## 2023-03-20 NOTE — TELEPHONE ENCOUNTER
Called pt regarding missed pharmacotherapy appt - r/s f/u for 3/23.     Mauricio Colunga, KatlynD, BCACP

## 2023-03-23 ENCOUNTER — DOCUMENTATION (OUTPATIENT)
Dept: MEDICAL GROUP | Facility: PHYSICIAN GROUP | Age: 68
End: 2023-03-23
Payer: MEDICARE

## 2023-03-23 NOTE — PROGRESS NOTES
Spoke to Justin today regarding his missed appointment.  His insurance company is not covering this visit.  He is going to look into it and then give us a call back.

## 2023-03-25 DIAGNOSIS — Z79.4 TYPE 2 DIABETES MELLITUS WITH HYPERGLYCEMIA, WITH LONG-TERM CURRENT USE OF INSULIN (HCC): ICD-10-CM

## 2023-03-25 DIAGNOSIS — E11.65 TYPE 2 DIABETES MELLITUS WITH HYPERGLYCEMIA, WITH LONG-TERM CURRENT USE OF INSULIN (HCC): ICD-10-CM

## 2023-03-30 DIAGNOSIS — E11.65 TYPE 2 DIABETES MELLITUS WITH HYPERGLYCEMIA, WITH LONG-TERM CURRENT USE OF INSULIN (HCC): ICD-10-CM

## 2023-03-30 DIAGNOSIS — Z79.4 TYPE 2 DIABETES MELLITUS WITH HYPERGLYCEMIA, WITH LONG-TERM CURRENT USE OF INSULIN (HCC): ICD-10-CM

## 2023-03-30 RX ORDER — INSULIN GLARGINE 100 [IU]/ML
70 INJECTION, SOLUTION SUBCUTANEOUS EVERY EVENING
Qty: 15 ML | Refills: 3 | Status: SHIPPED | OUTPATIENT
Start: 2023-03-30 | End: 2023-06-27

## 2023-03-31 DIAGNOSIS — Z79.4 TYPE 2 DIABETES MELLITUS WITH HYPERGLYCEMIA, WITH LONG-TERM CURRENT USE OF INSULIN (HCC): ICD-10-CM

## 2023-03-31 DIAGNOSIS — E11.65 TYPE 2 DIABETES MELLITUS WITH HYPERGLYCEMIA, WITH LONG-TERM CURRENT USE OF INSULIN (HCC): ICD-10-CM

## 2023-05-19 ENCOUNTER — OFFICE VISIT (OUTPATIENT)
Dept: MEDICAL GROUP | Facility: PHYSICIAN GROUP | Age: 68
End: 2023-05-19
Payer: MEDICARE

## 2023-05-19 VITALS
BODY MASS INDEX: 38.14 KG/M2 | DIASTOLIC BLOOD PRESSURE: 62 MMHG | HEART RATE: 81 BPM | RESPIRATION RATE: 20 BRPM | TEMPERATURE: 97.8 F | OXYGEN SATURATION: 89 % | SYSTOLIC BLOOD PRESSURE: 108 MMHG | HEIGHT: 67 IN | WEIGHT: 243 LBS

## 2023-05-19 DIAGNOSIS — G47.00 INSOMNIA, UNSPECIFIED TYPE: ICD-10-CM

## 2023-05-19 DIAGNOSIS — E11.42 DIABETIC POLYNEUROPATHY ASSOCIATED WITH TYPE 2 DIABETES MELLITUS (HCC): ICD-10-CM

## 2023-05-19 DIAGNOSIS — B35.3 TINEA PEDIS OF BOTH FEET: ICD-10-CM

## 2023-05-19 DIAGNOSIS — L84 CALLUS OF FOOT: ICD-10-CM

## 2023-05-19 DIAGNOSIS — Z79.4 TYPE 2 DIABETES MELLITUS WITH HYPERGLYCEMIA, WITH LONG-TERM CURRENT USE OF INSULIN (HCC): ICD-10-CM

## 2023-05-19 DIAGNOSIS — E11.65 TYPE 2 DIABETES MELLITUS WITH HYPERGLYCEMIA, WITH LONG-TERM CURRENT USE OF INSULIN (HCC): ICD-10-CM

## 2023-05-19 DIAGNOSIS — E78.5 DYSLIPIDEMIA ASSOCIATED WITH TYPE 2 DIABETES MELLITUS (HCC): ICD-10-CM

## 2023-05-19 DIAGNOSIS — F17.210 CIGARETTE SMOKER: ICD-10-CM

## 2023-05-19 DIAGNOSIS — E11.69 DYSLIPIDEMIA ASSOCIATED WITH TYPE 2 DIABETES MELLITUS (HCC): ICD-10-CM

## 2023-05-19 DIAGNOSIS — M21.612 BILATERAL BUNIONS: ICD-10-CM

## 2023-05-19 DIAGNOSIS — M21.611 BILATERAL BUNIONS: ICD-10-CM

## 2023-05-19 DIAGNOSIS — M21.539: ICD-10-CM

## 2023-05-19 LAB
HBA1C MFR BLD: 9 % (ref ?–5.8)
POCT INT CON NEG: NEGATIVE
POCT INT CON POS: POSITIVE

## 2023-05-19 PROCEDURE — 83036 HEMOGLOBIN GLYCOSYLATED A1C: CPT | Performed by: STUDENT IN AN ORGANIZED HEALTH CARE EDUCATION/TRAINING PROGRAM

## 2023-05-19 PROCEDURE — 99215 OFFICE O/P EST HI 40 MIN: CPT | Performed by: STUDENT IN AN ORGANIZED HEALTH CARE EDUCATION/TRAINING PROGRAM

## 2023-05-19 PROCEDURE — 3078F DIAST BP <80 MM HG: CPT | Performed by: STUDENT IN AN ORGANIZED HEALTH CARE EDUCATION/TRAINING PROGRAM

## 2023-05-19 PROCEDURE — 3074F SYST BP LT 130 MM HG: CPT | Performed by: STUDENT IN AN ORGANIZED HEALTH CARE EDUCATION/TRAINING PROGRAM

## 2023-05-19 RX ORDER — KETOCONAZOLE 20 MG/G
1 CREAM TOPICAL DAILY
Qty: 30 G | Refills: 1 | Status: SHIPPED | OUTPATIENT
Start: 2023-05-19

## 2023-05-19 RX ORDER — ZOLPIDEM TARTRATE 10 MG/1
10 TABLET ORAL NIGHTLY PRN
Qty: 90 TABLET | Refills: 0 | Status: SHIPPED | OUTPATIENT
Start: 2023-05-19 | End: 2023-08-17

## 2023-05-19 ASSESSMENT — FIBROSIS 4 INDEX: FIB4 SCORE: 1.14

## 2023-05-19 NOTE — PATIENT INSTRUCTIONS
Isidro [phone] (my medical assistant)     Ideal blood pressure <130/80 and at least <140/90.    Ideal glucose ranges: fasting , random (1-2 hours after eating) <180. If glucose <70, we need to adjust your medication to prevent continuing low blood sugar.    Divide the dose (twice a day) if the insulin is leaking out  Long-acting insulin:   Adjust dose according to FASTING BLOOD GLUCOSE target  mg/dL  (1) Increase the insulin dose every 3-4 days as needed.   (2) Increase by 2 units if FBG average concentration is 131-170 mg/dL.   (3) Increase by 4 units if FBG average concentration is 171-210 mg/dL.   (4) Increase by 6 units if FBG average concentration is 221-260 mg/dL.   (5) Increase by 8 units if FBG average concentration is greater than 261mg/dL and call us.      Consider cutting back by 1-2 units to previous dose if glucose concentration is below 80 mg/dL or symptoms of hypoglycemia.

## 2023-05-19 NOTE — PROGRESS NOTES
"Subjective:     Chief Complaint   Patient presents with    Diabetes Follow-up     HPI:   Justin presents today for follow-up.    Reports he stopped most of his medication approx 4m ago (wellbutrin, zoloft, lipitor, humalog). States his wife recently passed approx 1 week ago. States he has local family support. Sleep is poor and would like a refill of his ambien. Denies adverse SE.    Wonders about getting a dexcom to check his blood sugar. States 124 this AM, continue with metformin 1000mg BID and increased lantus to 80u QHS about 2m ago.    Taking Gabapenin 1200 TID (800mg and 400mg together) for neuropathy.    Review of Systems   Constitutional:  Positive for malaise/fatigue and weight loss. Negative for chills and fever.   Respiratory:  Negative for cough and shortness of breath.    Cardiovascular:  Negative for chest pain, palpitations and leg swelling.   Gastrointestinal:  Negative for abdominal pain, constipation, diarrhea, nausea and vomiting.   Neurological:  Positive for tingling and sensory change. Negative for dizziness, focal weakness, weakness and headaches.   Psychiatric/Behavioral:  Positive for depression. Negative for substance abuse and suicidal ideas. The patient has insomnia.      Objective:     Exam:  /62 (BP Location: Right arm, Patient Position: Sitting, BP Cuff Size: Adult)   Pulse 81   Temp 36.6 °C (97.8 °F) (Temporal)   Resp 20   Ht 1.702 m (5' 7\")   Wt 110 kg (243 lb)   SpO2 89%   BMI 38.06 kg/m²  Body mass index is 38.06 kg/m².    Physical Exam  Vitals reviewed.   Constitutional:       Appearance: Normal appearance.   HENT:      Head: Normocephalic and atraumatic.      Nose: Nose normal.      Mouth/Throat:      Mouth: Mucous membranes are moist.   Eyes:      Conjunctiva/sclera: Conjunctivae normal.   Cardiovascular:      Rate and Rhythm: Normal rate and regular rhythm.      Heart sounds: Normal heart sounds. No murmur heard.  Pulmonary:      Effort: Pulmonary effort is normal. " No respiratory distress.      Breath sounds: Normal breath sounds. No wheezing, rhonchi or rales.   Musculoskeletal:      Cervical back: Normal range of motion and neck supple.      Right lower leg: No edema.      Left lower leg: No edema.      Right foot: Bunion present.      Left foot: Bunion present.   Feet:      Comments: Monofilament testing with a 10 gram force: sensation intact: decreased bilaterally  Visual Inspection: Feet without maceration, ulcers, fissures.   Pedal pulses: intact bilaterally  Skin:     General: Skin is warm and dry.      Capillary Refill: Capillary refill takes less than 2 seconds.      Findings: Rash present. No erythema.      Comments: Scaling in moccasin distribution bilateral feet, toenails thickened and yellow.  Calluses noted.   Neurological:      General: No focal deficit present.      Mental Status: He is alert and oriented to person, place, and time.      Sensory: Sensory deficit present.      Motor: No weakness.      Gait: Gait normal.   Psychiatric:         Attention and Perception: Attention normal.         Mood and Affect: Affect normal. Mood is depressed.         Speech: Speech normal.         Behavior: Behavior normal. Behavior is cooperative.         Thought Content: Thought content normal.         Cognition and Memory: Cognition normal.         Judgment: Judgment normal.       Labs: Reviewed from 9/20/2021    Assessment & Plan:     68 y.o. male with the following -     1. Insomnia, unspecified type  Chronic, not well controlled. Aware of boxed warning of ambien and declines trial of alternative at this time. PDMP reviewed, CS agreement UTD.  - zolpidem (AMBIEN) 10 MG Tab; Take 1 Tablet by mouth at bedtime as needed for Sleep for up to 90 days.  Dispense: 90 Tablet; Refill: 0    2. Type 2 diabetes mellitus with hyperglycemia, with long-term current use of insulin (HCC)  Chronic, uncontrolled. Encouraged diabetic diet and exercise as tolerated.  We will start Ozempic as  below, discussed how to titrate dose and possible side effects, no contraindications.  Continue metformin as below however stressed importance of obtaining lab work as soon as possible to ensure normal renal function.  Patient advised on how to titrate his insulin but at the current dose will need to divide and inject twice daily due to volume.  - A1C in 3 months. Discussed ideal glucose ranges.  - Retinopathy screening advised.  - POCT Hemoglobin A1C  - Comp Metabolic Panel; Future  - Lipid Profile; Future  - CBC WITH DIFFERENTIAL; Future  - MICROALBUMIN CREAT RATIO URINE; Future  - Semaglutide,0.25 or 0.5MG/DOS, 2 MG/3ML Solution Pen-injector; Inject 0.25 mg under the skin every 7 days for 28 days, THEN 0.5 mg every 7 days for 28 days.  Dispense: 3 mL; Refill:     metformin (GLUCOPHAGE) 1000 MG tablet, Take 1 Tablet by mouth 2 times a day., Disp: 180 Tablet, Rfl: 0    insulin glargine (LANTUS SOLOSTAR) 100 UNIT/ML Solution Pen-injector injection, Inject 70 Units under the skin every evening. Or as directed for glucose control (Patient taking differently: Inject 80 Units under the skin every evening. Or as directed for glucose control), Disp: 15 mL, Rfl: 31  - Referral to Ophthalmology  - Referral to Podiatry  - Continuous Blood Gluc Sensor (DEXCOM G6 SENSOR) Misc; 1 Device every 10 days.  Dispense: 3 Each; Refill: 12  - Continuous Blood Gluc Transmit (DEXCOM G6 TRANSMITTER) Misc; 1 Device Q30 DAYS.  Dispense: 1 Each; Refill: 4    3. Dyslipidemia associated with type 2 diabetes mellitus (HCC)  This is a chronic condition, patient stopped his atorvastatin.  Agrees to restart at 40 mg nightly if poorly controlled, plan pending results.  - Comp Metabolic Panel; Future  - Lipid Profile; Future  - TSH WITH REFLEX TO FT4; Future    4. Diabetic polyneuropathy associated with type 2 diabetes mellitus (HCC)  This is a chronic condition, fair control.  Discussed importance of glycemic control.  Continue medication as below  but advised patient the importance of evaluating his renal function to ensure this dose is appropriate.  - Referral to Podiatry    gabapentin (NEURONTIN) 400 MG Cap, Take 400 mg by mouth 3 times a day., Disp: , Rfl:     gabapentin (NEURONTIN) 800 MG tablet, Take 1 Tablet by mouth 3 times a day., Disp: 270 Tablet, Rfl: 0    5. Cigarette smoker  Acute on chronic tobacco use in the setting of recent death of his wife.  Aware of benefits of cessation, declines any additional resources for quitting at this time. Will monitor.    6. Acquired clawfoot, unspecified laterality  7. Bilateral bunions  8. Callus of foot  Chronic, with benefit to reestablish with podiatry.  - Referral to Podiatry    9. Tinea pedis of both feet  Treat with ketoconazole below, discussed foot care.  - ketoconazole (NIZORAL) 2 % Cream; Apply 1 Application topically every day. Apply to feet until 1 week after resolved, typically for 4 to 6 weeks  Dispense: 30 g; Refill: 1    I spent a total of 43 minutes with record review, exam, communication with the patient, and documentation of this encounter.    Return in about 4 weeks (around 6/16/2023), or if symptoms worsen or fail to improve, for Diabetes.    Please note that this dictation was created using voice recognition software. I have made every reasonable attempt to correct obvious errors, but I expect that there are errors of grammar and possibly content that I did not discover before finalizing the note.

## 2023-05-21 PROBLEM — J40 BRONCHITIS: Status: RESOLVED | Noted: 2017-03-01 | Resolved: 2023-05-21

## 2023-05-21 RX ORDER — PROCHLORPERAZINE 25 MG/1
1 SUPPOSITORY RECTAL
Qty: 3 EACH | Refills: 12 | Status: SHIPPED | OUTPATIENT
Start: 2023-05-21 | End: 2023-09-07

## 2023-05-21 RX ORDER — PROCHLORPERAZINE 25 MG/1
1 SUPPOSITORY RECTAL
Qty: 1 EACH | Refills: 4 | Status: SHIPPED | OUTPATIENT
Start: 2023-05-21 | End: 2023-09-07

## 2023-05-22 PROBLEM — F17.210 CIGARETTE SMOKER: Status: ACTIVE | Noted: 2021-09-16

## 2023-05-22 RX ORDER — GABAPENTIN 400 MG/1
400 CAPSULE ORAL 3 TIMES DAILY
COMMUNITY

## 2023-05-22 ASSESSMENT — ENCOUNTER SYMPTOMS
FEVER: 0
TINGLING: 1
PALPITATIONS: 0
SENSORY CHANGE: 1
WEAKNESS: 0
WEIGHT LOSS: 1
SHORTNESS OF BREATH: 0
NAUSEA: 0
CONSTIPATION: 0
VOMITING: 0
COUGH: 0
DEPRESSION: 1
CHILLS: 0
DIARRHEA: 0
HEADACHES: 0
FOCAL WEAKNESS: 0
ABDOMINAL PAIN: 0
DIZZINESS: 0
INSOMNIA: 1

## 2023-05-22 ASSESSMENT — LIFESTYLE VARIABLES: SUBSTANCE_ABUSE: 0

## 2023-05-25 ENCOUNTER — HOSPITAL ENCOUNTER (OUTPATIENT)
Dept: LAB | Facility: MEDICAL CENTER | Age: 68
End: 2023-05-25
Attending: STUDENT IN AN ORGANIZED HEALTH CARE EDUCATION/TRAINING PROGRAM
Payer: MEDICARE

## 2023-05-25 DIAGNOSIS — Z79.4 TYPE 2 DIABETES MELLITUS WITH HYPERGLYCEMIA, WITH LONG-TERM CURRENT USE OF INSULIN (HCC): ICD-10-CM

## 2023-05-25 DIAGNOSIS — E11.69 DYSLIPIDEMIA ASSOCIATED WITH TYPE 2 DIABETES MELLITUS (HCC): ICD-10-CM

## 2023-05-25 DIAGNOSIS — E11.65 TYPE 2 DIABETES MELLITUS WITH HYPERGLYCEMIA, WITH LONG-TERM CURRENT USE OF INSULIN (HCC): ICD-10-CM

## 2023-05-25 DIAGNOSIS — E78.5 DYSLIPIDEMIA ASSOCIATED WITH TYPE 2 DIABETES MELLITUS (HCC): ICD-10-CM

## 2023-05-25 LAB
ALBUMIN SERPL BCP-MCNC: 4 G/DL (ref 3.2–4.9)
ALBUMIN/GLOB SERPL: 1.2 G/DL
ALP SERPL-CCNC: 75 U/L (ref 30–99)
ALT SERPL-CCNC: 15 U/L (ref 2–50)
ANION GAP SERPL CALC-SCNC: 14 MMOL/L (ref 7–16)
AST SERPL-CCNC: 17 U/L (ref 12–45)
BASOPHILS # BLD AUTO: 1.2 % (ref 0–1.8)
BASOPHILS # BLD: 0.11 K/UL (ref 0–0.12)
BILIRUB SERPL-MCNC: 0.9 MG/DL (ref 0.1–1.5)
BUN SERPL-MCNC: 13 MG/DL (ref 8–22)
CALCIUM ALBUM COR SERPL-MCNC: 9.5 MG/DL (ref 8.5–10.5)
CALCIUM SERPL-MCNC: 9.5 MG/DL (ref 8.5–10.5)
CHLORIDE SERPL-SCNC: 106 MMOL/L (ref 96–112)
CHOLEST SERPL-MCNC: 216 MG/DL (ref 100–199)
CO2 SERPL-SCNC: 23 MMOL/L (ref 20–33)
CREAT SERPL-MCNC: 0.82 MG/DL (ref 0.5–1.4)
CREAT UR-MCNC: 145.12 MG/DL
EOSINOPHIL # BLD AUTO: 0.17 K/UL (ref 0–0.51)
EOSINOPHIL NFR BLD: 1.9 % (ref 0–6.9)
ERYTHROCYTE [DISTWIDTH] IN BLOOD BY AUTOMATED COUNT: 47.8 FL (ref 35.9–50)
FASTING STATUS PATIENT QL REPORTED: NORMAL
GFR SERPLBLD CREATININE-BSD FMLA CKD-EPI: 95 ML/MIN/1.73 M 2
GLOBULIN SER CALC-MCNC: 3.3 G/DL (ref 1.9–3.5)
GLUCOSE SERPL-MCNC: 134 MG/DL (ref 65–99)
HCT VFR BLD AUTO: 52.9 % (ref 42–52)
HDLC SERPL-MCNC: 35 MG/DL
HGB BLD-MCNC: 17.3 G/DL (ref 14–18)
IMM GRANULOCYTES # BLD AUTO: 0.03 K/UL (ref 0–0.11)
IMM GRANULOCYTES NFR BLD AUTO: 0.3 % (ref 0–0.9)
LDLC SERPL CALC-MCNC: 143 MG/DL
LYMPHOCYTES # BLD AUTO: 2.29 K/UL (ref 1–4.8)
LYMPHOCYTES NFR BLD: 25.9 % (ref 22–41)
MCH RBC QN AUTO: 29.3 PG (ref 27–33)
MCHC RBC AUTO-ENTMCNC: 32.7 G/DL (ref 32.3–36.5)
MCV RBC AUTO: 89.7 FL (ref 81.4–97.8)
MICROALBUMIN UR-MCNC: 3 MG/DL
MICROALBUMIN/CREAT UR: 21 MG/G (ref 0–30)
MONOCYTES # BLD AUTO: 0.66 K/UL (ref 0–0.85)
MONOCYTES NFR BLD AUTO: 7.5 % (ref 0–13.4)
NEUTROPHILS # BLD AUTO: 5.57 K/UL (ref 1.82–7.42)
NEUTROPHILS NFR BLD: 63.2 % (ref 44–72)
NRBC # BLD AUTO: 0 K/UL
NRBC BLD-RTO: 0 /100 WBC (ref 0–0.2)
PLATELET # BLD AUTO: 282 K/UL (ref 164–446)
PMV BLD AUTO: 9.8 FL (ref 9–12.9)
POTASSIUM SERPL-SCNC: 4.6 MMOL/L (ref 3.6–5.5)
PROT SERPL-MCNC: 7.3 G/DL (ref 6–8.2)
RBC # BLD AUTO: 5.9 M/UL (ref 4.7–6.1)
SODIUM SERPL-SCNC: 143 MMOL/L (ref 135–145)
TRIGL SERPL-MCNC: 189 MG/DL (ref 0–149)
TSH SERPL DL<=0.005 MIU/L-ACNC: 1.28 UIU/ML (ref 0.38–5.33)
WBC # BLD AUTO: 8.8 K/UL (ref 4.8–10.8)

## 2023-05-25 PROCEDURE — 36415 COLL VENOUS BLD VENIPUNCTURE: CPT

## 2023-05-25 PROCEDURE — 82570 ASSAY OF URINE CREATININE: CPT

## 2023-05-25 PROCEDURE — 85025 COMPLETE CBC W/AUTO DIFF WBC: CPT

## 2023-05-25 PROCEDURE — 82043 UR ALBUMIN QUANTITATIVE: CPT

## 2023-05-25 PROCEDURE — 80053 COMPREHEN METABOLIC PANEL: CPT

## 2023-05-25 PROCEDURE — 84443 ASSAY THYROID STIM HORMONE: CPT

## 2023-05-25 PROCEDURE — 80061 LIPID PANEL: CPT

## 2023-06-29 ENCOUNTER — TELEPHONE (OUTPATIENT)
Dept: MEDICAL GROUP | Facility: PHYSICIAN GROUP | Age: 68
End: 2023-06-29
Payer: MEDICARE

## 2023-06-29 NOTE — TELEPHONE ENCOUNTER
Phone Number Called: [phone]    Call outcome: Spoke to patient regarding message below.    Message: Spoke with the patient regarding this matter of a medication refill.  I did advise the patient that the reason that these medications were refused was because he needed an appointment with his primary care physician.  He also stated that he would either have them or die.  When I did offer him an appointment he did state that he was in [place] at this point in time.  Patient has also no showed a few appointments and letter has been sent out to his address.

## 2023-07-03 ENCOUNTER — APPOINTMENT (OUTPATIENT)
Dept: SLEEP MEDICINE | Facility: MEDICAL CENTER | Age: 68
End: 2023-07-03
Attending: STUDENT IN AN ORGANIZED HEALTH CARE EDUCATION/TRAINING PROGRAM
Payer: MEDICARE

## 2023-07-10 ENCOUNTER — OFFICE VISIT (OUTPATIENT)
Dept: MEDICAL GROUP | Facility: PHYSICIAN GROUP | Age: 68
End: 2023-07-10
Payer: MEDICARE

## 2023-07-10 VITALS
OXYGEN SATURATION: 90 % | HEART RATE: 90 BPM | RESPIRATION RATE: 20 BRPM | TEMPERATURE: 97.5 F | WEIGHT: 240 LBS | SYSTOLIC BLOOD PRESSURE: 100 MMHG | BODY MASS INDEX: 37.67 KG/M2 | DIASTOLIC BLOOD PRESSURE: 62 MMHG | HEIGHT: 67 IN

## 2023-07-10 DIAGNOSIS — E11.65 TYPE 2 DIABETES MELLITUS WITH HYPERGLYCEMIA, WITH LONG-TERM CURRENT USE OF INSULIN (HCC): ICD-10-CM

## 2023-07-10 DIAGNOSIS — Z79.4 TYPE 2 DIABETES MELLITUS WITH HYPERGLYCEMIA, WITH LONG-TERM CURRENT USE OF INSULIN (HCC): ICD-10-CM

## 2023-07-10 DIAGNOSIS — E78.5 DYSLIPIDEMIA ASSOCIATED WITH TYPE 2 DIABETES MELLITUS (HCC): ICD-10-CM

## 2023-07-10 DIAGNOSIS — E11.69 DYSLIPIDEMIA ASSOCIATED WITH TYPE 2 DIABETES MELLITUS (HCC): ICD-10-CM

## 2023-07-10 PROCEDURE — 3078F DIAST BP <80 MM HG: CPT

## 2023-07-10 PROCEDURE — 99214 OFFICE O/P EST MOD 30 MIN: CPT

## 2023-07-10 PROCEDURE — 3074F SYST BP LT 130 MM HG: CPT

## 2023-07-10 RX ORDER — ATORVASTATIN CALCIUM 80 MG/1
80 TABLET, FILM COATED ORAL NIGHTLY
Qty: 90 TABLET | Refills: 3 | Status: SHIPPED | OUTPATIENT
Start: 2023-07-10

## 2023-07-10 ASSESSMENT — FIBROSIS 4 INDEX: FIB4 SCORE: 1.06

## 2023-07-10 NOTE — PROGRESS NOTES
"Subjective:     CC:   Chief Complaint   Patient presents with    Lab Results    Follow-Up     Follow up on meds       HISTORY OF THE PRESENT ILLNESS: Justin is a pleasant 68 y.o. male here today to follow up on his labs and medications.  His PCP is unavailable today.   Patient was encouraged to follow-up on his elevated cholesterol back in May, however, he did not show up to his appointment.  He is here today to address this.    He is an uncontrolled diabetic with an LDL of 143.  He is currently managed on atorvastatin 40 mg nightly.  He states he has been noncompliant with his medication since his wife passed away May 10.    Type 2 Diabetes:  Last A1C 9. He was started on Ozempic at last appt.  Patient states he thought he was supposed to stop taking his Lantus once he started the Ozempic. He is inquiring if this is correct.   Since he wasn't sure what to do, he stopped taking his Ozempic and went back to just taking his Lantus.   He has not yet taken all three medications together (Lantus 80 units, Ozempic, Metformin 1,000 mg BID).     ROS:  All systems negative expect as addressed in assessment and plan.     Objective:     Exam:  /62 (BP Location: Left arm, Patient Position: Sitting, BP Cuff Size: Adult)   Pulse 90   Temp 36.4 °C (97.5 °F) (Temporal)   Resp 20   Ht 1.702 m (5' 7\")   Wt 109 kg (240 lb)   SpO2 90%   BMI 37.59 kg/m²  Body mass index is 37.59 kg/m².    Physical Exam  Constitutional:       Appearance: Normal appearance.   Cardiovascular:      Rate and Rhythm: Normal rate.   Pulmonary:      Effort: Pulmonary effort is normal.   Neurological:      Mental Status: He is alert. Mental status is at baseline.   Psychiatric:         Mood and Affect: Mood normal.         Behavior: Behavior normal.       Labs: Results reviewed from 05/25/23    Assessment & Plan:     68 y.o. male with the following -    1. Dyslipidemia associated with type 2 diabetes mellitus (HCC)  Chronic, controlled.  Patient had " been noncompliant with medications approximately 2 weeks prior to lab draw.  I suspect his LDL was already elevated.  I will increase his dose of atorvastatin to 80 mg for goal LDL<70.  Patient verbalized understanding.  - atorvastatin (LIPITOR) 80 MG tablet; Take 1 Tablet by mouth every evening.  Dispense: 90 Tablet; Refill: 3    2. Type 2 diabetes mellitus with hyperglycemia, with long-term current use of insulin (HCC)  Chronic, not controlled.  After review of prior notes, PCP did intend to add Ozempic to the current regimen.  Patient is to continue with his Lantus as well as metformin.  He will follow-up in clinic for a check of his A1c on 8/30/2023.    Patient was educated in proper administration of medication(s) ordered today including safety, possible SE, risks, benefits, rationale and alternatives to therapy.   Supportive care, differential diagnoses, and indications for immediate follow-up discussed with patient.    Pathogenesis of diagnosis discussed including typical length and natural progression.    Instructed to return to clinic or nearest emergency department for any change in condition, further concerns, or worsening of symptoms.  Patient states understanding of the plan of care and discharge instructions.    Return in about 8 weeks (around 9/4/2023).    I have placed the above orders and discussed them with an approved delegating provider.  The MA is performing the below orders under the direction of Dr. Erwin.    Please note that this dictation was created using voice recognition software. I have worked with consultants from the vendor as well as technical experts from [employer] to optimize the interface. I have made every reasonable attempt to correct obvious errors, but I expect that there are errors of grammar and possibly content that I did not discover before finalizing the note.

## 2023-07-21 DIAGNOSIS — Z79.4 TYPE 2 DIABETES MELLITUS WITH HYPERGLYCEMIA, WITH LONG-TERM CURRENT USE OF INSULIN (HCC): ICD-10-CM

## 2023-07-21 DIAGNOSIS — E11.65 TYPE 2 DIABETES MELLITUS WITH HYPERGLYCEMIA, WITH LONG-TERM CURRENT USE OF INSULIN (HCC): ICD-10-CM

## 2023-07-22 RX ORDER — INSULIN GLARGINE 100 [IU]/ML
80 INJECTION, SOLUTION SUBCUTANEOUS EVERY EVENING
Qty: 15 ML | Refills: 0 | Status: SHIPPED | OUTPATIENT
Start: 2023-07-22 | End: 2023-08-16

## 2023-08-01 ENCOUNTER — APPOINTMENT (OUTPATIENT)
Dept: RADIOLOGY | Facility: MEDICAL CENTER | Age: 68
End: 2023-08-01
Attending: EMERGENCY MEDICINE
Payer: MEDICARE

## 2023-08-01 ENCOUNTER — HOSPITAL ENCOUNTER (EMERGENCY)
Facility: MEDICAL CENTER | Age: 68
End: 2023-08-01
Attending: EMERGENCY MEDICINE
Payer: MEDICARE

## 2023-08-01 ENCOUNTER — OFFICE VISIT (OUTPATIENT)
Dept: URGENT CARE | Facility: PHYSICIAN GROUP | Age: 68
End: 2023-08-01
Payer: MEDICARE

## 2023-08-01 VITALS
WEIGHT: 239.64 LBS | RESPIRATION RATE: 16 BRPM | HEIGHT: 67 IN | SYSTOLIC BLOOD PRESSURE: 110 MMHG | DIASTOLIC BLOOD PRESSURE: 62 MMHG | HEART RATE: 85 BPM | TEMPERATURE: 97.2 F | BODY MASS INDEX: 37.61 KG/M2 | OXYGEN SATURATION: 94 %

## 2023-08-01 VITALS
BODY MASS INDEX: 37.35 KG/M2 | RESPIRATION RATE: 16 BRPM | HEIGHT: 67 IN | TEMPERATURE: 97 F | HEART RATE: 90 BPM | DIASTOLIC BLOOD PRESSURE: 72 MMHG | SYSTOLIC BLOOD PRESSURE: 96 MMHG | WEIGHT: 238 LBS | OXYGEN SATURATION: 91 %

## 2023-08-01 DIAGNOSIS — Z87.891 HISTORY OF TOBACCO ABUSE: ICD-10-CM

## 2023-08-01 DIAGNOSIS — R63.4 WEIGHT LOSS: ICD-10-CM

## 2023-08-01 DIAGNOSIS — B96.89 ACUTE BACTERIAL RHINOSINUSITIS: ICD-10-CM

## 2023-08-01 DIAGNOSIS — E11.42 DIABETIC POLYNEUROPATHY ASSOCIATED WITH TYPE 2 DIABETES MELLITUS (HCC): ICD-10-CM

## 2023-08-01 DIAGNOSIS — J06.9 UPPER RESPIRATORY TRACT INFECTION, UNSPECIFIED TYPE: ICD-10-CM

## 2023-08-01 DIAGNOSIS — J01.90 ACUTE BACTERIAL RHINOSINUSITIS: ICD-10-CM

## 2023-08-01 LAB — GLUCOSE BLD-MCNC: 138 MG/DL (ref 65–99)

## 2023-08-01 PROCEDURE — 99283 EMERGENCY DEPT VISIT LOW MDM: CPT | Mod: 25

## 2023-08-01 PROCEDURE — 3074F SYST BP LT 130 MM HG: CPT | Performed by: STUDENT IN AN ORGANIZED HEALTH CARE EDUCATION/TRAINING PROGRAM

## 2023-08-01 PROCEDURE — 99406 BEHAV CHNG SMOKING 3-10 MIN: CPT | Performed by: STUDENT IN AN ORGANIZED HEALTH CARE EDUCATION/TRAINING PROGRAM

## 2023-08-01 PROCEDURE — 3078F DIAST BP <80 MM HG: CPT | Performed by: STUDENT IN AN ORGANIZED HEALTH CARE EDUCATION/TRAINING PROGRAM

## 2023-08-01 PROCEDURE — 99215 OFFICE O/P EST HI 40 MIN: CPT | Mod: 25 | Performed by: STUDENT IN AN ORGANIZED HEALTH CARE EDUCATION/TRAINING PROGRAM

## 2023-08-01 PROCEDURE — 71045 X-RAY EXAM CHEST 1 VIEW: CPT

## 2023-08-01 PROCEDURE — 82962 GLUCOSE BLOOD TEST: CPT | Performed by: STUDENT IN AN ORGANIZED HEALTH CARE EDUCATION/TRAINING PROGRAM

## 2023-08-01 RX ORDER — AMOXICILLIN AND CLAVULANATE POTASSIUM 875; 125 MG/1; MG/1
1 TABLET, FILM COATED ORAL 2 TIMES DAILY
Qty: 10 TABLET | Refills: 0 | Status: SHIPPED | OUTPATIENT
Start: 2023-08-01 | End: 2023-08-06

## 2023-08-01 ASSESSMENT — FIBROSIS 4 INDEX
FIB4 SCORE: 1.06
FIB4 SCORE: 1.06

## 2023-08-01 NOTE — PROGRESS NOTES
Subjective:   CHIEF COMPLAINT  Chief Complaint   Patient presents with    Sinusitis     Green mucus, cough, x2 weeks        HPI  Justin Gil is a 68 y.o. male who presents complaining of sinus congestion and runny nose x2 weeks.  Now says he is producing a productive cough.  Says he is having green rhinorrhea and green sputum.  He has tried taking aspirin which has not helped.  Positive ROS for diarrhea today; 1 bout of loose stools.  Also reports wheezing.  Also says he has been losing weight unintentionally but says this happened while taking care of his wife who recently passed away.  Last colonoscopy was over 10 years ago with 2 polyps.  He has not had any fevers.  No nausea or vomiting.  No abdominal pain, chest pain or shortness of breath.  No lightheadedness or near syncope episodes.    REVIEW OF SYSTEMS  General: no fever or chills  GI: no nausea or vomiting  See HPI for further details.    PAST MEDICAL HISTORY  Patient Active Problem List    Diagnosis Date Noted    Callus of foot 05/19/2023    Tinea pedis of both feet 05/19/2023    ELLIOT on CPAP 11/30/2022    Anxiety 11/30/2022    Panic attacks 11/30/2022    Reactive depression 11/30/2022    Insomnia 11/30/2022    Non compliance with medical treatment 11/30/2022    Chronic bilateral low back pain with bilateral sciatica 02/17/2022    Class 3 severe obesity with serious comorbidity and body mass index (BMI) of 40.0 to 44.9 in adult ([facility]) 02/17/2022    Lumbar stenosis 02/09/2022    Lumbar nerve root impingement 02/09/2022    Hypertension associated with type 2 diabetes mellitus ([facility]) 09/17/2021    Arthritis of left wrist 09/16/2021    Grief at loss of child 09/16/2021    Diabetic polyneuropathy associated with type 2 diabetes mellitus ([facility]) 09/16/2021    Cigarette smoker 09/16/2021    Shortness of breath 09/16/2021    Vaping nicotine dependence, tobacco product 09/16/2021    Type 2 diabetes mellitus with hyperglycemia, with long-term current use of  "insulin ([facility]) 2021    Dyslipidemia associated with type 2 diabetes mellitus ([facility]) 2021    Bilateral bunions 2014    Allergic rhinitis 2014    Lower leg pain 2013    History of colonic polyps 2012    Acquired clawfoot 2012    Asthma 2010    Lumbosacral ligament sprain 05/10/2010       SURGICAL HISTORY   has a past surgical history that includes bronchoscopy () and umbilical hernia repair.    ALLERGIES  No Known Allergies    CURRENT MEDICATIONS  amoxicillin-clavulanate Tabs  atorvastatin  Dexcom G6 Sensor Misc  Dexcom G6 Transmitter Misc  gabapentin  gabapentin Caps  ketoconazole Crea  Lantus SoloStar Sopn  lisinopril Tabs  metformin  zolpidem Tabs    SOCIAL HISTORY  Social History     Tobacco Use    Smoking status: Former     Packs/day: 2.00     Years: 60.00     Pack years: 120.00     Types: Cigarettes     Quit date: 2014     Years since quittin.8    Smokeless tobacco: Never   Vaping Use    Vaping Use: Former    Substances: Nicotine    Devices: Pre-filled or refillable cartridge, Refillable tank   Substance and Sexual Activity    Alcohol use: Never    Drug use: Never    Sexual activity: Not on file       FAMILY HISTORY  Family History   Problem Relation Age of Onset    Heart Disease Mother          at 84    COPD Mother     COPD Father          at 72    Cancer Father         renal at 71    COPD Sister     Colon Cancer Neg Hx           Objective:   PHYSICAL EXAM  VITAL SIGNS: BP 96/72 (BP Location: Right arm, Patient Position: Sitting, BP Cuff Size: Adult)   Pulse 90   Temp 36.1 °C (97 °F) (Temporal)   Resp 16   Ht 1.702 m (5' 7\")   Wt 108 kg (238 lb)   SpO2 91%   BMI 37.28 kg/m²     Gen: no acute distress, normal voice  Skin: dry, intact, moist mucosal membranes  Eyes: No conjunctival injection bilaterally.  Neck: Normal range of motion. No meningeal signs.   Lungs: No increased work of breathing.  CTAB w/ symmetric expansion  CV: RRR w/o " murmurs or clicks  Psych: normal affect, normal judgement, alert, awake    POC g    Assessment/Plan:     1. Acute bacterial rhinosinusitis  amoxicillin-clavulanate (AUGMENTIN) 875-125 MG Tab    DX-CHEST-2 VIEWS      2. History of tobacco abuse        3. Diabetic polyneuropathy associated with type 2 diabetes mellitus (HCC)  POCT Glucose      4. Weight loss  Referral to GI for Colonoscopy      1) history consistent with a bacterial sinus infection  -Ordered Augmentin    2) resumed smoking May 2023 after his wife .  Encouraged the patient to discontinue using tobacco.  I did recommend contacting [employer] [facility] [employer] resources. 3 minutes spent on tobacco cessation counseling.     3) on presentation blood pressure was soft at 80/60. On repeat it came up to 97/72. POC glc = 138. Patient is on lisinopril 5 mg which he took this morning.  He is not on any additional antihypertensive medications. He did not appear dehydrated.  He is not experiencing any chest pain.  No abdominal pain.  He denies experiencing any sx of orthostasis and is asymptomatic from this perspective.    4) patient stated he has lost a lot of weight which happened while he was taking care of of his wife.  Says his last colonoscopy was over 10 years ago  -Ordered referral for colonoscopy  -Ordered chest x-ray.  Contact patient [phone] w/ results of CXR. OK to leave .     Differential diagnosis and supportive care discussed. Follow-up as needed if symptoms worsen or fail to improve to PCP, Urgent care or Emergency Room.    42 minutes was spent caring for this patient on the day of the encounter which included review of previous medical records, face-to-face time, discussing the diagnosis, medical management, and completion of the chart. This does not include time spent on separately billable procedures/tests.      Please note that this dictation was created using voice recognition software. I have made a reasonable attempt to correct  obvious errors, but I expect that there are errors of grammar and possibly content that I did not discover before finalizing the note.

## 2023-08-01 NOTE — ED TRIAGE NOTES
"Chief Complaint   Patient presents with    Flu Like Symptoms     Cough and congestion started 2 weeks ago. Denies fever, chest pain or SOB. UC told pt to come to ER for chest xray.     /52   Pulse 95   Temp 36.3 °C (97.3 °F) (Temporal)   Resp 18   Ht 1.702 m (5' 7\")   Wt 109 kg (239 lb 10.2 oz)   SpO2 93%   BMI 37.53 kg/m²     "

## 2023-08-01 NOTE — ED PROVIDER NOTES
ED PHYSICIAN NOTE    CHIEF COMPLAINT  Chief Complaint   Patient presents with    Flu Like Symptoms     Cough and congestion started 2 weeks ago. Denies fever, chest pain or SOB. UC told pt to come to ER for chest xray.       EXTERNAL RECORDS REVIEWED  Patient was seen in urgent care today.  He was diagnosed with sinusitis.  He was sent to the ER for chest x-ray.    HPI/ROS      Justin Gil is a 68 y.o. male who presents with a cough.  This started about 2 weeks ago.  Has had congestion and runny nose.  Nasal discharge turned green.  Cough seems to have worsened.  No shortness of breath or chest pain.  No leg swelling or leg pain.  He was seen at the urgent care and told to come here for chest x-ray.    Patient denies abdominal pain, nausea, vomiting, lightheadedness dizziness weakness.    PAST MEDICAL HISTORY  Past Medical History:   Diagnosis Date    Bronchitis 3/1/2017    PNA (pneumonia)     with septic shock per patient       SOCIAL HISTORY  Social History     Tobacco Use    Smoking status: Former     Packs/day: 2.00     Years: 60.00     Pack years: 120.00     Types: Cigarettes     Quit date: 2014     Years since quittin.8    Smokeless tobacco: Never   Vaping Use    Vaping Use: Former    Substances: Nicotine    Devices: Pre-filled or refillable cartridge, Refillable tank   Substance Use Topics    Alcohol use: Never    Drug use: Never       CURRENT MEDICATIONS  Home Medications       Reviewed by Tyrell Jules R.N. (Registered Nurse) on 23 at 1131  Med List Status: Partial     Medication Last Dose Status   amoxicillin-clavulanate (AUGMENTIN) 875-125 MG Tab  Active   atorvastatin (LIPITOR) 80 MG tablet  Active   Continuous Blood Gluc Sensor (DEXCOM G6 SENSOR) Misc  Active   Continuous Blood Gluc Transmit (DEXCOM G6 TRANSMITTER) Misc  Active   gabapentin (NEURONTIN) 400 MG Cap  Active   gabapentin (NEURONTIN) 800 MG tablet  Active   ketoconazole (NIZORAL) 2 % Cream  Active   LANTUS  "SOLOSTAR 100 UNIT/ML Solution Pen-injector injection  Active   lisinopril (PRINIVIL) 5 MG Tab  Active   metformin (GLUCOPHAGE) 1000 MG tablet  Active   zolpidem (AMBIEN) 10 MG Tab  Active                    ALLERGIES  No Known Allergies    PHYSICAL EXAM  VITAL SIGNS: /62   Pulse 85   Temp 36.2 °C (97.2 °F)   Resp 18   Ht 1.702 m (5' 7\")   Wt 109 kg (239 lb 10.2 oz)   SpO2 94%   BMI 37.53 kg/m²    Constitutional: Awake and alert  HENT: Mucosal edema.  No purulent discharge  Eyes: Normal inspection  Neck: Grossly normal range of motion.  Cardiovascular: Normal heart rate, Normal rhythm.  Symmetric peripheral pulses.   Thorax & Lungs: No respiratory distress, No wheezing, No rales, No rhonchi, No chest tenderness.   Abdomen: Bowel sounds normal, soft, non-distended, nontender, no mass  Skin: No obvious rash.  Back: No tenderness, No CVA tenderness.   Extremities: No clubbing, cyanosis, edema, no Homans or cords.  Neurologic: Grossly normal   Psychiatric: Normal for situation         RADIOLOGY  I have independently interpreted the diagnostic imaging associated with this visit and am waiting the final reading from the radiologist.   My preliminary interpretation is as follows: Chest x-ray negative for infiltrate  Radiologist interpretation:   DX-CHEST-PORTABLE (1 VIEW)   Final Result         1. No acute cardiopulmonary abnormalities are identified.            COURSE & MEDICAL DECISION MAKING        INITIAL ASSESSMENT, COURSE AND PLAN  Care Narrative: Patient presents with URI symptoms he was seen in urgent care diagnosed with sinusitis.  Given a prescription for Augmentin.  He was sent here for chest x-ray.  This was ordered.    Chest x-ray was obtained and is negative for infiltrate.  Patient was advised to continue with treatment plan as given by previous physician.  I do not see any evidence of sepsis systemic illness toxicity.  He is not hypoxic.  Patient will be discharged to return to the ER for " concerning symptoms      ADDITIONAL PROBLEM LIST  Past Medical History:   Diagnosis Date    Bronchitis 3/1/2017    PNA (pneumonia) 2001    with septic shock per patient       DISPOSITION AND DISCUSSIONS    Escalation of care considered, and ultimately not performed:blood analysis      Prescription drugs considered and/or prescribed: Patient was prescribed Augmentin by previous provider    FINAL IMPRESSION  1.  Upper respiratory infection  2.  Possible bacterial sinusitis    This dictation was created using voice recognition software. The accuracy of the dictation is limited to the abilities of the software. I expect there may be some errors of grammar and possibly content. The nursing notes were reviewed and certain aspects of this information were incorporated into this note.    Electronically signed by: Ricardo Bates M.D., 8/1/2023

## 2023-08-01 NOTE — ED NOTES
"Pt discharged to home w/ steady gait. Pt A&Ox4 on RA. Pt in possession of belongings. Pt provided discharge education and information pertaining to medications and follow up appointments. Pt received copy of discharge instructions and verbalized understanding. /62   Pulse 85   Temp 36.2 °C (97.2 °F)   Resp 16   Ht 1.702 m (5' 7\")   Wt 109 kg (239 lb 10.2 oz)   SpO2 94%   BMI 37.53 kg/m²    "

## 2023-08-14 DIAGNOSIS — E11.65 TYPE 2 DIABETES MELLITUS WITH HYPERGLYCEMIA, WITH LONG-TERM CURRENT USE OF INSULIN (HCC): ICD-10-CM

## 2023-08-14 DIAGNOSIS — Z79.4 TYPE 2 DIABETES MELLITUS WITH HYPERGLYCEMIA, WITH LONG-TERM CURRENT USE OF INSULIN (HCC): ICD-10-CM

## 2023-08-15 DIAGNOSIS — E11.65 TYPE 2 DIABETES MELLITUS WITH HYPERGLYCEMIA, WITH LONG-TERM CURRENT USE OF INSULIN (HCC): ICD-10-CM

## 2023-08-15 DIAGNOSIS — Z79.4 TYPE 2 DIABETES MELLITUS WITH HYPERGLYCEMIA, WITH LONG-TERM CURRENT USE OF INSULIN (HCC): ICD-10-CM

## 2023-08-16 RX ORDER — INSULIN GLARGINE 100 [IU]/ML
80 INJECTION, SOLUTION SUBCUTANEOUS EVERY EVENING
Qty: 15 ML | Refills: 0 | Status: SHIPPED | OUTPATIENT
Start: 2023-08-16 | End: 2023-09-07 | Stop reason: SDUPTHER

## 2023-08-16 RX ORDER — INSULIN GLARGINE 100 [IU]/ML
80 INJECTION, SOLUTION SUBCUTANEOUS EVERY EVENING
Qty: 15 ML | Refills: 0 | OUTPATIENT
Start: 2023-08-16

## 2023-09-07 ENCOUNTER — OFFICE VISIT (OUTPATIENT)
Dept: MEDICAL GROUP | Facility: PHYSICIAN GROUP | Age: 68
End: 2023-09-07
Payer: MEDICARE

## 2023-09-07 VITALS
TEMPERATURE: 97.8 F | OXYGEN SATURATION: 92 % | DIASTOLIC BLOOD PRESSURE: 68 MMHG | WEIGHT: 214.6 LBS | HEIGHT: 67 IN | BODY MASS INDEX: 33.68 KG/M2 | SYSTOLIC BLOOD PRESSURE: 98 MMHG | RESPIRATION RATE: 16 BRPM | HEART RATE: 91 BPM

## 2023-09-07 DIAGNOSIS — Z79.4 TYPE 2 DIABETES MELLITUS WITH HYPERGLYCEMIA, WITH LONG-TERM CURRENT USE OF INSULIN (HCC): ICD-10-CM

## 2023-09-07 DIAGNOSIS — J01.81 OTHER ACUTE RECURRENT SINUSITIS: ICD-10-CM

## 2023-09-07 DIAGNOSIS — E11.65 TYPE 2 DIABETES MELLITUS WITH HYPERGLYCEMIA, WITH LONG-TERM CURRENT USE OF INSULIN (HCC): ICD-10-CM

## 2023-09-07 DIAGNOSIS — J06.9 ACUTE URI: ICD-10-CM

## 2023-09-07 LAB
HBA1C MFR BLD: 8.7 % (ref ?–5.8)
POCT INT CON NEG: POSITIVE
POCT INT CON POS: POSITIVE

## 2023-09-07 PROCEDURE — 3074F SYST BP LT 130 MM HG: CPT

## 2023-09-07 PROCEDURE — 99214 OFFICE O/P EST MOD 30 MIN: CPT

## 2023-09-07 PROCEDURE — 83036 HEMOGLOBIN GLYCOSYLATED A1C: CPT

## 2023-09-07 PROCEDURE — 3078F DIAST BP <80 MM HG: CPT

## 2023-09-07 RX ORDER — INSULIN GLARGINE 100 [IU]/ML
80 INJECTION, SOLUTION SUBCUTANEOUS EVERY EVENING
Qty: 45 ML | Refills: 3 | Status: SHIPPED | OUTPATIENT
Start: 2023-09-07

## 2023-09-07 RX ORDER — METFORMIN HYDROCHLORIDE 500 MG/1
500 TABLET, EXTENDED RELEASE ORAL DAILY
Qty: 30 TABLET | Refills: 3 | Status: SHIPPED | OUTPATIENT
Start: 2023-09-07

## 2023-09-07 ASSESSMENT — FIBROSIS 4 INDEX: FIB4 SCORE: 1.06

## 2023-09-07 ASSESSMENT — PATIENT HEALTH QUESTIONNAIRE - PHQ9: CLINICAL INTERPRETATION OF PHQ2 SCORE: 6

## 2023-09-07 NOTE — ASSESSMENT & PLAN NOTE
Chronic, unstable. Has used short acting/long acting insulins in the past and metformin. Reports he is nervous about taking metformin because he has read too many articles on facebook suggesting it is bad for you.  Discussed complications of high blood sugars long term and goals of control  Reports he has not been taking FBS, encouraged this at east a couple times weekly.  Goal <7.0%  Will continue lantus 80 units nightly  Start metformin 500 mg XR daily    Has modified diet, lost weight.

## 2023-09-07 NOTE — PROGRESS NOTES
Chief Complaint   Patient presents with    Sinusitis     Pt reported return  from  visit x 2 days   Mucus and yellowish color     Other     Diabetic follow up   Pt reported out of his insulin, it was not new by PCP  His A1C is 8.7 today        Pt presents today for diabetes, requesting refill on insulin. PCP Dr. Garcia.  Reports his wife  in May, has had increased life stressors over the past year.     HPI: Patient is a 68 y.o. male complaining of 4 days of illness including: nasal congestion.   Mucus is: thick.  Similarly ill exposures: yes.  Treatments tried: nothing    He  reports that he quit smoking about 8 years ago. His smoking use included cigarettes. He started smoking about 69 years ago. He has a 120.0 pack-year smoking history. He has never used smokeless tobacco..     ROS:  No fever, nausea, changes in bowel movements or skin rash. Does have increased cough, recently restarted smoking.      I reviewed the patient's medications, allergies and medical history:  Current Outpatient Medications   Medication Sig Dispense Refill    metFORMIN ER (GLUCOPHAGE XR) 500 MG TABLET SR 24 HR Take 1 Tablet by mouth every day. 30 Tablet 3    insulin glargine (LANTUS SOLOSTAR) 100 UNIT/ML Solution Pen-injector injection Inject 80 Units under the skin every evening. Or as directed for glucose control 45 mL 3    atorvastatin (LIPITOR) 80 MG tablet Take 1 Tablet by mouth every evening. 90 Tablet 3    lisinopril (PRINIVIL) 5 MG Tab TAKE 1 TABLET BY MOUTH EVERY DAY 90 Tablet 0    gabapentin (NEURONTIN) 400 MG Cap Take 400 mg by mouth 3 times a day.      ketoconazole (NIZORAL) 2 % Cream Apply 1 Application topically every day. Apply to feet until 1 week after resolved, typically for 4 to 6 weeks 30 g 1    gabapentin (NEURONTIN) 800 MG tablet Take 1 Tablet by mouth 3 times a day. 270 Tablet 0     No current facility-administered medications for this visit.     Patient has no known allergies.  Past Medical History:  "  Diagnosis Date    Bronchitis 3/1/2017    PNA (pneumonia) 2001    with septic shock per patient        EXAM:  BP 98/68 (BP Location: Left arm, Patient Position: Sitting, BP Cuff Size: Adult)   Pulse 91   Temp 36.6 °C (97.8 °F) (Temporal)   Resp 16   Ht 1.702 m (5' 7\")   Wt 97.3 kg (214 lb 9.6 oz)   SpO2 92%   General: Alert, no conversational dyspnea or audible wheeze, non-toxic appearance.  Eyes: PERRL, conjunctiva slightly injected, no eye discharge.  Ears: Normal pinnae,TM's erythematous bilaterally.  Nares: mask in place  Sinuses: non tender over maxillary / frontal sinuses.  Throat: Erythematous injection without exudate.   Neck: Supple, with no adenopathy.  Lungs: Clear to auscultation bilaterally, no wheeze, crackles or rhonchi.   Heart: Regular rate without murmur.  Skin: Warm and dry without rash.     Problem List Items Addressed This Visit       Type 2 diabetes mellitus with hyperglycemia, with long-term current use of insulin (HCC)     Chronic, unstable. Has used short acting/long acting insulins in the past and metformin. Reports he is nervous about taking metformin because he has read too many articles on [employer] suggesting it is bad for you.  Discussed complications of high blood sugars long term and goals of control  Reports he has not been taking FBS, encouraged this at east a couple times weekly.  Goal <7.0%  Will continue lantus 80 units nightly  Start metformin 500 mg XR daily    Has modified diet, lost weight.           Relevant Medications    metFORMIN ER (GLUCOPHAGE XR) 500 MG TABLET SR 24 HR    insulin glargine (LANTUS SOLOSTAR) 100 UNIT/ML Solution Pen-injector injection    Other Relevant Orders    POCT Hemoglobin A1C (Completed)    Referral to Pharmacotherapy Service    Other acute recurrent sinusitis     Acute, unstable. Onset of symptoms 4 days ago, son has similar symptoms. Reports thick mucous in sinuses, increase in cough. Denies sob, face pain/ear pain.   Has not tried anything " for this.            Other Visit Diagnoses       Acute URI                PLAN:  1. Educated patient that majority of upper respiratory infections are viral and do not need antibiotics.   2. Twice daily use of nasal saline rinse or Neti-Pot.  3. OTC anti-pyretics and decongestants as needed.  4. Follow-up in office or urgent care for worsening symptoms, difficulty breathing, lack of expected recovery, or should new symptoms or problems arise.

## 2023-09-07 NOTE — ASSESSMENT & PLAN NOTE
Acute, unstable. Onset of symptoms 4 days ago, son has similar symptoms. Reports thick mucous in sinuses, increase in cough. Denies sob, face pain/ear pain.   Has not tried anything for this.

## 2023-09-07 NOTE — PATIENT INSTRUCTIONS
Viral Symptom Relief for Adults  You have been diagnosed with a viral illness.  Antibiotics do not cure viral infections. [facility] is committed to treating your illness in the best way possible and that includes avoiding antibiotics when they are likely to do more harm than good. The treatments recommended below will help you feel better while your body’s own defenses are fighting the virus    General instructions:  Stay hydrated  Use a cool mist vaporizer to relieve congestion    Specific medications:   Cough   Suppressant: Dextromethorphan hydrobromide liquid  7.5 mg/5 mL (Triaminic Long Acting Cough, Robitussin Lingering LA Cough)    Expectorant: Guaifenesin extended release 600 mg tablets  (Mucinex, Mucus Relief)       Headache; ear, throat, muscle, or joint pain; or fever   Alternate acetaminophen and ibuprofen every 4 hours      Sore throat   Menthol lozenges (Cepacol, Vicks VapoDrops)     Nasal congestion   Nasal Spray: Sodium chloride (saline) nasal spray (Ocean, Simply Saline)    By mouth: Pseudoephedrine 30 mg tablets (Sudafed)      Runny nose, itchy and watery eyes, and sneezing   Loratadine 10 mg tablets (Claritin)      Important Medication Instructions   Use medicines according to package instructions or as directed by your healthcare provider.    Stop the medications when symptoms improve.    Brand names are listed convenience; any brand may be utilized as long as it has the same active ingredient     Follow-up:   If not improved in 7 days, new symptoms occur, or you have other concerns please call or return for a recheck.

## 2023-09-25 ENCOUNTER — TELEPHONE (OUTPATIENT)
Dept: HEALTH INFORMATION MANAGEMENT | Facility: OTHER | Age: 68
End: 2023-09-25
Payer: MEDICARE

## 2023-10-17 ENCOUNTER — DOCUMENTATION (OUTPATIENT)
Dept: VASCULAR LAB | Facility: MEDICAL CENTER | Age: 68
End: 2023-10-17
Payer: MEDICARE

## 2023-10-17 NOTE — PROGRESS NOTES
[facility]     Received diabetes referral from Mami Regalado on 9-7-23.     Called and spoke to patient. Initial visit scheduled on 11-16 at 11a.    1st call     Insurance: Medicare  PCP: [facility]?  Locations to be seen: Any    If no response by 11-16 OR 2 no shows/cancellations, will remove from referral list     [facility] at [phone], fax [phone]    Kareem Valladares, PharmD

## 2023-11-17 ENCOUNTER — TELEPHONE (OUTPATIENT)
Dept: VASCULAR LAB | Facility: MEDICAL CENTER | Age: 68
End: 2023-11-17
Payer: MEDICARE

## 2023-12-07 ENCOUNTER — NON-PROVIDER VISIT (OUTPATIENT)
Dept: MEDICAL GROUP | Facility: PHYSICIAN GROUP | Age: 68
End: 2023-12-07
Payer: MEDICARE

## 2023-12-07 DIAGNOSIS — E11.65 TYPE 2 DIABETES MELLITUS WITH HYPERGLYCEMIA, WITH LONG-TERM CURRENT USE OF INSULIN (HCC): ICD-10-CM

## 2023-12-07 DIAGNOSIS — Z79.4 TYPE 2 DIABETES MELLITUS WITH HYPERGLYCEMIA, WITH LONG-TERM CURRENT USE OF INSULIN (HCC): ICD-10-CM

## 2023-12-07 PROCEDURE — 99211 OFF/OP EST MAY X REQ PHY/QHP: CPT | Performed by: FAMILY MEDICINE

## 2023-12-07 RX ORDER — BUPROPION HYDROCHLORIDE 150 MG/1
150 TABLET, EXTENDED RELEASE ORAL 2 TIMES DAILY
COMMUNITY

## 2023-12-07 NOTE — PROGRESS NOTES
Patient Consult Note     TIME IN: 140pm   TIME OUT: 157pm       Primary care physician: Juany Garcia D.O.    Reason for consult: Management of Uncontrolled Type 2 Diabetes    HPI:  Justin Gil is a 67 y.o. old patient who comes in today for evaluation of above stated problem.    Allergies:  Patient has no known allergies.    Most Recent labs, A1c, and immunizations:    Lab Results   Component Value Date/Time    HBA1C 8.7 (A) 09/07/2023 12:10 PM          Lab Results   Component Value Date/Time    CHOLSTRLTOT 216 (H) 05/25/2023 01:57 PM     (H) 05/25/2023 01:57 PM    HDL 35 (A) 05/25/2023 01:57 PM    TRIGLYCERIDE 189 (H) 05/25/2023 01:57 PM       Lab Results   Component Value Date/Time    SODIUM 143 05/25/2023 01:57 PM    POTASSIUM 4.6 05/25/2023 01:57 PM    CHLORIDE 106 05/25/2023 01:57 PM    CO2 23 05/25/2023 01:57 PM    GLUCOSE 134 (H) 05/25/2023 01:57 PM    BUN 13 05/25/2023 01:57 PM    CREATININE 0.82 05/25/2023 01:57 PM     Lab Results   Component Value Date/Time    ALKPHOSPHAT 75 05/25/2023 01:57 PM    ASTSGOT 17 05/25/2023 01:57 PM    ALTSGPT 15 05/25/2023 01:57 PM    TBILIRUBIN 0.9 05/25/2023 01:57 PM    ALBUMIN 4.0 05/25/2023 01:57 PM      Lab Results   Component Value Date/Time    MALBCRT 21 05/25/2023 01:57 PM    MICROALBUR 3.0 05/25/2023 01:57 PM         Immunization History   Administered Date(s) Administered    Influenza Vac Subunit Quad Inj (Pf) 12/14/2017    Influenza Vaccine Quad Inj (Pf) 10/07/2020    Influenza, Unspecified - HISTORICAL DATA 09/26/2019    Pfizer SARS-CoV-2 Vaccine 12+ 03/23/2021, 04/13/2021    Pneumococcal Conjugate Vaccine (Prevnar/PCV-13) 09/26/2019    Tdap Vaccine 03/06/2012         Medications:    Current Outpatient Medications:     atorvastatin, 80 mg, Oral, Nightly, Taking    lisinopril, 5 mg, Oral, DAILY, Taking    metFORMIN ER, 500 mg, Oral, DAILY    Lantus SoloStar, 80 Units, Subcutaneous, Q EVENING    gabapentin, 400 mg, Oral, TID    ketoconazole, 1  Application, Topical, DAILY    gabapentin, 800 mg, Oral, TID    History/ Failed Diabetic Medications if applicable:  n/a    Preventative Management  BP regimen (ACE/ARB) - lisinopril   ASA - no  Statin - none  Last Retinal Scan - will get it done  Monofilament/foot exam - neuropathy     Pt has home glucometer and proper testing technique - yes  Pt reports blood sugars:   Before Breakfast: 100-180  Before Lunch:   Before Dinner:   Before Bedtime:   Other times:     Hypoglycemia awareness - yes  Nocturnal hypoglycemia- no  Hypoglycemia:  None    Physical Examination:  Vital signs: There were no vitals taken for this visit. There is no height or weight on file to calculate BMI.    Change in weight: Stable  Exercise habits: minimal exercise   Diet: common adult    Assessment and Plan:  1. DM2   Chief complaint of numbness bilaterally on both feet, as such more aggressive glucose control should be observed  a1c above goal   He is due for labs  paperwork for Jardiance and Trulicity provided to the patient in the past, but was never returned in full  At subsequent appointments hopefully we will be able to reduce Lantus and use more cardioprotective agents such as Jardiance or a GLP-1  May benefit from Actos in the future-but worry about possible weight gain, limit dose to 30 mg max.,  No diagnosis of congestive heart failure.  Medication(s) recommended:   Metformin 1000 twice a day  Lantus 80 units daily   Consider SGLT2 at subsequent appointment  Consider GLP-1 at subsequent appointment  Consider Actos at subsequent appointment  Consider restarting short acting insulin such as Humalog at subsequent appointment    Long-acting insulin:   Adjust dose according to FASTING BLOOD GLUCOSE target  mg/dL  (1) Increase the insulin dose every 3-4 days as needed.   (2) Increase by 2 units if FBG average concentration is 131-170 mg/dL.   (3) Increase by 4 units if FBG average concentration is 171-210 mg/dL.   (4) Increase by 6  units if FBG average concentration is 221-260 mg/dL.   (5) Increase by 8 units if FBG average concentration is greater than 261mg/dL and call us.     Consider cutting back by 1-2 units to previous dose if glucose concentration is below 80 mg/dL or symptoms of hypoglycemia.     Correctional insulin dosing  Glucose reading  Change to be made to insulin dose    < 80 mg/dL  Subtract 1 unit     mg/dL  15 units     126-170 mg/dL  Add 1 unit    171-215 mg/dL  Add 2 units    216-260 mg/dL  Add 3 units       -Blood glucose and A1c target    However, more aggressive diabetic control is reasonable when tolerated.  Pt's treatment of Hypoglycemia. 15:15 Rule discussed with patient      2.  Cardiovascular  Triglycerides above goal  Likely benefit from a fibrate or fish oil  We will get updated labs  Medication(s) recommended.   Continue lisinopril 5 mg daily  Continue atorvastatin 80 mg daily  May benefit from Zetia 10 mg once daily if LDLs are greater than 100      3.  Lifestyle changes   Focus on eating a DASH/Mediterranean-style diet.   Exercise 30 minutes daily at least 5 days/week, as tolerated.  https://www.niddk.nih.gov/bwp    Follow-up appointment in 3 week(s)    Jw Quesada, PharmD, MS, BCACP, [facility]  Phone [phone] fax [phone]    This note was created using voice recognition software (Dragon). The accuracy of the dictation is limited by the abilities of the software. I have reviewed the note prior to signing, however some errors in grammar and context are still possible. If you have any questions related to this note please do not hesitate to contact our office.     CC:   Juany Garcia D.O.  No ref. provider found  Dr. Gene Oviedo

## 2024-05-22 DIAGNOSIS — E11.65 TYPE 2 DIABETES MELLITUS WITH HYPERGLYCEMIA, WITH LONG-TERM CURRENT USE OF INSULIN (HCC): ICD-10-CM

## 2024-05-22 DIAGNOSIS — Z79.4 TYPE 2 DIABETES MELLITUS WITH HYPERGLYCEMIA, WITH LONG-TERM CURRENT USE OF INSULIN (HCC): ICD-10-CM

## 2024-05-22 RX ORDER — INSULIN GLARGINE 100 [IU]/ML
80 INJECTION, SOLUTION SUBCUTANEOUS EVERY EVENING
Qty: 90 ML | Refills: 3 | Status: SHIPPED | OUTPATIENT
Start: 2024-05-22

## 2024-05-22 NOTE — TELEPHONE ENCOUNTER
Received request via: Patient    Was the patient seen in the last year in this department? Yes    Does the patient have an active prescription (recently filled or refills available) for medication(s) requested? No    Pharmacy Name: Pharmacy:   [id number] - [place], [place] - [address] [facility]     Does the patient have halfway Plus and need 100 day supply (blood pressure, diabetes and cholesterol meds only)? Patient does not have SCP

## 2024-06-27 DIAGNOSIS — E11.59 HYPERTENSION ASSOCIATED WITH TYPE 2 DIABETES MELLITUS (HCC): ICD-10-CM

## 2024-06-27 DIAGNOSIS — I15.2 HYPERTENSION ASSOCIATED WITH TYPE 2 DIABETES MELLITUS (HCC): ICD-10-CM

## 2024-06-28 DIAGNOSIS — E11.65 TYPE 2 DIABETES MELLITUS WITH HYPERGLYCEMIA, WITH LONG-TERM CURRENT USE OF INSULIN (HCC): ICD-10-CM

## 2024-06-28 DIAGNOSIS — Z79.4 TYPE 2 DIABETES MELLITUS WITH HYPERGLYCEMIA, WITH LONG-TERM CURRENT USE OF INSULIN (HCC): ICD-10-CM

## 2024-06-28 DIAGNOSIS — E11.69 DYSLIPIDEMIA ASSOCIATED WITH TYPE 2 DIABETES MELLITUS (HCC): ICD-10-CM

## 2024-06-28 DIAGNOSIS — I15.2 HYPERTENSION ASSOCIATED WITH TYPE 2 DIABETES MELLITUS (HCC): ICD-10-CM

## 2024-06-28 DIAGNOSIS — E11.59 HYPERTENSION ASSOCIATED WITH TYPE 2 DIABETES MELLITUS (HCC): ICD-10-CM

## 2024-06-28 DIAGNOSIS — E78.5 DYSLIPIDEMIA ASSOCIATED WITH TYPE 2 DIABETES MELLITUS (HCC): ICD-10-CM

## 2024-06-28 RX ORDER — ATORVASTATIN CALCIUM 80 MG/1
80 TABLET, FILM COATED ORAL NIGHTLY
Qty: 90 TABLET | Refills: 0 | Status: SHIPPED | OUTPATIENT
Start: 2024-06-28

## 2024-06-28 RX ORDER — METFORMIN HYDROCHLORIDE 500 MG/1
1000 TABLET, EXTENDED RELEASE ORAL 2 TIMES DAILY
Qty: 360 TABLET | Refills: 0 | Status: SHIPPED | OUTPATIENT
Start: 2024-06-28

## 2024-06-28 RX ORDER — LISINOPRIL 5 MG/1
5 TABLET ORAL DAILY
Qty: 90 TABLET | Refills: 0 | OUTPATIENT
Start: 2024-06-28

## 2024-06-28 RX ORDER — LISINOPRIL 5 MG/1
5 TABLET ORAL DAILY
Qty: 90 TABLET | Refills: 0 | Status: SHIPPED | OUTPATIENT
Start: 2024-06-28

## 2024-06-28 NOTE — TELEPHONE ENCOUNTER
Received request via: Pharmacy    Was the patient seen in the last year in this department? Yes    Does the patient have an active prescription (recently filled or refills available) for medication(s) requested? No    Pharmacy Name: [facility], [address]    Does the patient have alf Plus and need 100 day supply (blood pressure, diabetes and cholesterol meds only)? Patient does not have SCP

## 2024-07-23 ENCOUNTER — APPOINTMENT (RX ONLY)
Dept: URBAN - METROPOLITAN AREA CLINIC 20 | Facility: CLINIC | Age: 69
Setting detail: DERMATOLOGY
End: 2024-07-23

## 2024-07-23 DIAGNOSIS — D18.0 HEMANGIOMA: ICD-10-CM

## 2024-07-23 DIAGNOSIS — L57.8 OTHER SKIN CHANGES DUE TO CHRONIC EXPOSURE TO NONIONIZING RADIATION: ICD-10-CM

## 2024-07-23 DIAGNOSIS — L82.1 OTHER SEBORRHEIC KERATOSIS: ICD-10-CM

## 2024-07-23 DIAGNOSIS — Z85.828 PERSONAL HISTORY OF OTHER MALIGNANT NEOPLASM OF SKIN: ICD-10-CM

## 2024-07-23 DIAGNOSIS — L81.4 OTHER MELANIN HYPERPIGMENTATION: ICD-10-CM

## 2024-07-23 DIAGNOSIS — L57.0 ACTINIC KERATOSIS: ICD-10-CM

## 2024-07-23 DIAGNOSIS — D22 MELANOCYTIC NEVI: ICD-10-CM

## 2024-07-23 PROBLEM — C44.529 SQUAMOUS CELL CARCINOMA OF SKIN OF OTHER PART OF TRUNK: Status: ACTIVE | Noted: 2024-07-23

## 2024-07-23 PROBLEM — D48.5 NEOPLASM OF UNCERTAIN BEHAVIOR OF SKIN: Status: ACTIVE | Noted: 2024-07-23

## 2024-07-23 PROBLEM — D22.5 MELANOCYTIC NEVI OF TRUNK: Status: ACTIVE | Noted: 2024-07-23

## 2024-07-23 PROBLEM — D18.01 HEMANGIOMA OF SKIN AND SUBCUTANEOUS TISSUE: Status: ACTIVE | Noted: 2024-07-23

## 2024-07-23 PROCEDURE — ? COUNSELING

## 2024-07-23 PROCEDURE — ? LIQUID NITROGEN

## 2024-07-23 PROCEDURE — 99203 OFFICE O/P NEW LOW 30 MIN: CPT | Mod: 25

## 2024-07-23 PROCEDURE — ? BIOPSY BY SHAVE METHOD

## 2024-07-23 PROCEDURE — 11102 TANGNTL BX SKIN SINGLE LES: CPT | Mod: 59

## 2024-07-23 PROCEDURE — 17004 DESTROY PREMAL LESIONS 15/>: CPT

## 2024-07-23 PROCEDURE — 69100 BIOPSY OF EXTERNAL EAR: CPT

## 2024-07-23 ASSESSMENT — LOCATION SIMPLE DESCRIPTION DERM
LOCATION SIMPLE: RIGHT ANTERIOR NECK
LOCATION SIMPLE: RIGHT CHEEK
LOCATION SIMPLE: RIGHT FOREARM
LOCATION SIMPLE: RIGHT FOREHEAD
LOCATION SIMPLE: LEFT FOREARM
LOCATION SIMPLE: LEFT UPPER BACK
LOCATION SIMPLE: LEFT HAND
LOCATION SIMPLE: RIGHT UPPER BACK
LOCATION SIMPLE: RIGHT HAND
LOCATION SIMPLE: LEFT EAR
LOCATION SIMPLE: RIGHT OCCIPITAL SCALP
LOCATION SIMPLE: RIGHT TEMPLE
LOCATION SIMPLE: RIGHT NOSE
LOCATION SIMPLE: RIGHT EAR
LOCATION SIMPLE: LEFT SHOULDER
LOCATION SIMPLE: LEFT ELBOW

## 2024-07-23 ASSESSMENT — LOCATION DETAILED DESCRIPTION DERM
LOCATION DETAILED: LEFT DISTAL DORSAL FOREARM
LOCATION DETAILED: LEFT ELBOW
LOCATION DETAILED: RIGHT INFERIOR TEMPLE
LOCATION DETAILED: RIGHT SUPERIOR OCCIPITAL SCALP
LOCATION DETAILED: LEFT PROXIMAL RADIAL DORSAL FOREARM
LOCATION DETAILED: RIGHT NASAL SIDEWALL
LOCATION DETAILED: LEFT SUPERIOR UPPER BACK
LOCATION DETAILED: RIGHT INFERIOR POSTERIOR HELIX
LOCATION DETAILED: RIGHT PROXIMAL RADIAL DORSAL FOREARM
LOCATION DETAILED: RIGHT INFERIOR MEDIAL UPPER BACK
LOCATION DETAILED: RIGHT SUPERIOR UPPER BACK
LOCATION DETAILED: RIGHT RADIAL DORSAL HAND
LOCATION DETAILED: RIGHT SUPERIOR HELIX
LOCATION DETAILED: LEFT ANTIHELIX
LOCATION DETAILED: LEFT RADIAL DORSAL HAND
LOCATION DETAILED: RIGHT SUPERIOR FOREHEAD
LOCATION DETAILED: LEFT ANTERIOR SHOULDER
LOCATION DETAILED: LEFT ULNAR DORSAL HAND
LOCATION DETAILED: LEFT PROXIMAL DORSAL FOREARM
LOCATION DETAILED: LEFT DISTAL RADIAL DORSAL FOREARM
LOCATION DETAILED: RIGHT INFERIOR CENTRAL MALAR CHEEK
LOCATION DETAILED: RIGHT PROXIMAL DORSAL FOREARM
LOCATION DETAILED: RIGHT INFERIOR ANTERIOR NECK
LOCATION DETAILED: RIGHT INFERIOR LATERAL FOREHEAD

## 2024-07-23 ASSESSMENT — LOCATION ZONE DERM
LOCATION ZONE: TRUNK
LOCATION ZONE: NECK
LOCATION ZONE: SCALP
LOCATION ZONE: FACE
LOCATION ZONE: NOSE
LOCATION ZONE: EAR
LOCATION ZONE: HAND
LOCATION ZONE: ARM

## 2024-07-23 NOTE — PROCEDURE: BIOPSY BY SHAVE METHOD
----- Message from Hari Curiel MD sent at 10/28/2020  4:28 PM CDT -----  Please notify patient with result. GERD, neg Barboza's. Gastritis, neg HP. PPI.  Repeat EGD in 3 years    
Spoke with patient on results. He verbalized understanding and thanks. Surgical hx and wait list updated. He has no further questions at this time.  
Detail Level: Detailed
Depth Of Biopsy: dermis
Was A Bandage Applied: Yes
Size Of Lesion In Cm: 0
Biopsy Type: H and E
Biopsy Method: Personna blade
Anesthesia Type: 1% lidocaine with 1:100,000 epinephrine and a 1:6 solution of 8.4% sodium bicarbonate
Anesthesia Volume In Cc: 0.2
Hemostasis: Drysol and Electrocautery
Wound Care: Aquaphor
Dressing: Band-Aid
Destruction After The Procedure: No
Type Of Destruction Used: Curettage
Curettage Text: The wound bed was treated with curettage after the biopsy was performed.
Cryotherapy Text: The wound bed was treated with cryotherapy after the biopsy was performed.
Electrodesiccation Text: The wound bed was treated with electrodesiccation after the biopsy was performed.
Electrodesiccation And Curettage Text: The wound bed was treated with electrodesiccation and curettage after the biopsy was performed.
Silver Nitrate Text: The wound bed was treated with silver nitrate after the biopsy was performed.
Lab: [id number]
Lab Facility: 
Consent: Written consent was obtained and risks were reviewed including but not limited to scarring, infection, bleeding, scabbing, incomplete removal, nerve damage and allergy to anesthesia.
Post-Care Instructions: I reviewed with the patient in detail post-care instructions. Patient is to keep the biopsy site dry overnight, and then apply bacitracin twice daily until healed. Patient may apply hydrogen peroxide soaks to remove any crusting.
Notification Instructions: Patient will be notified of biopsy results. However, patient instructed to call the office if not contacted within 2 weeks.
Billing Type: Third-Party Bill
Information: Selecting Yes will display possible errors in your note based on the variables you have selected. This validation is only offered as a suggestion for you. PLEASE NOTE THAT THE VALIDATION TEXT WILL BE REMOVED WHEN YOU FINALIZE YOUR NOTE. IF YOU WANT TO FAX A PRELIMINARY NOTE YOU WILL NEED TO TOGGLE THIS TO 'NO' IF YOU DO NOT WANT IT IN YOUR FAXED NOTE.

## 2024-10-01 ENCOUNTER — APPOINTMENT (RX ONLY)
Dept: URBAN - METROPOLITAN AREA CLINIC 20 | Facility: CLINIC | Age: 69
Setting detail: DERMATOLOGY
End: 2024-10-01

## 2024-10-01 NOTE — PROCEDURE: MIPS QUALITY
Quality 226: Preventive Care And Screening: Tobacco Use: Screening And Cessation Intervention: Patient screened for tobacco use and is an ex/non-smoker
Quality 130: Documentation Of Current Medications In The Medical Record: Current Medications Documented
Detail Level: Detailed
None

## 2024-10-04 ENCOUNTER — APPOINTMENT (RX ONLY)
Dept: URBAN - METROPOLITAN AREA CLINIC 20 | Facility: CLINIC | Age: 69
Setting detail: DERMATOLOGY
End: 2024-10-04

## 2024-10-04 DIAGNOSIS — L57.0 ACTINIC KERATOSIS: ICD-10-CM

## 2024-10-04 PROBLEM — D48.5 NEOPLASM OF UNCERTAIN BEHAVIOR OF SKIN: Status: ACTIVE | Noted: 2024-10-04

## 2024-10-04 PROCEDURE — 17000 DESTRUCT PREMALG LESION: CPT

## 2024-10-04 PROCEDURE — ? LIQUID NITROGEN

## 2024-10-04 PROCEDURE — 69100 BIOPSY OF EXTERNAL EAR: CPT | Mod: 59

## 2024-10-04 PROCEDURE — 17003 DESTRUCT PREMALG LES 2-14: CPT

## 2024-10-04 PROCEDURE — ? BIOPSY BY SHAVE METHOD

## 2024-10-04 ASSESSMENT — LOCATION DETAILED DESCRIPTION DERM
LOCATION DETAILED: RIGHT SUPERIOR HELIX
LOCATION DETAILED: RIGHT INFERIOR HELIX

## 2024-10-04 ASSESSMENT — LOCATION SIMPLE DESCRIPTION DERM: LOCATION SIMPLE: RIGHT EAR

## 2024-10-04 ASSESSMENT — LOCATION ZONE DERM: LOCATION ZONE: EAR

## 2024-12-02 ENCOUNTER — APPOINTMENT (RX ONLY)
Dept: URBAN - METROPOLITAN AREA CLINIC 36 | Facility: CLINIC | Age: 69
Setting detail: DERMATOLOGY
End: 2024-12-02

## 2024-12-02 PROBLEM — D04.22 CARCINOMA IN SITU OF SKIN OF LEFT EAR AND EXTERNAL AURICULAR CANAL: Status: ACTIVE | Noted: 2024-12-02

## 2024-12-02 PROCEDURE — 17311 MOHS 1 STAGE H/N/HF/G: CPT

## 2024-12-02 PROCEDURE — ? MOHS SURGERY

## 2024-12-02 PROCEDURE — 12051 INTMD RPR FACE/MM 2.5 CM/<: CPT

## 2024-12-02 NOTE — PROCEDURE: MOHS SURGERY
Include Anticoagulation In Mohs Note?: No
Zygomaticofacial Flap Text: Given the location of the defect, shape of the defect and the proximity to free margins a zygomaticofacial flap was deemed most appropriate for repair.  Using a sterile surgical marker, the appropriate flap was drawn incorporating the defect and placing the expected incisions within the relaxed skin tension lines where possible. The area thus outlined was incised deep to adipose tissue with a #15 scalpel blade with preservation of a vascular pedicle.  The skin margins were undermined to an appropriate distance in all directions utilizing iris scissors.  The flap was then placed into the defect and anchored with interrupted buried subcutaneous sutures.
Plastic Surgeon Procedure Text (A): After obtaining clear surgical margins the patient was sent to plastics for surgical repair.  The patient understands they will receive post-surgical care and follow-up from the referring physician's office.
Stage 12: Number Of Blocks?: 0
Show Repair Assistants Variable: Yes
Anesthesia Type: 1% lidocaine with epinephrine
Repair Anesthesia Method: local infiltration
Alternatives Discussed Intro (Do Not Add Period): I discussed alternative treatments to Mohs surgery and specifically discussed the risks and benefits of
Nasalis-Muscle-Based Myocutaneous Island Pedicle Flap Text: Using a #15 blade, an incision was made around the donor flap to the level of the nasalis muscle. Wide lateral undermining was then performed in both the subcutaneous plane above the nasalis muscle, and in a submuscular plane just above periosteum. This allowed the formation of a free nasalis muscle axial pedicle (based on the angular artery) which was still attached to the actual cutaneous flap, increasing its mobility and vascular viability. Hemostasis was obtained with pinpoint electrocoagulation. The flap was mobilized into position and the pivotal anchor points positioned and stabilized with buried interrupted sutures. Subcutaneous and dermal tissues were closed in a multilayered fashion with sutures. Tissue redundancies were excised, and the epidermal edges were apposed without significant tension and sutured with sutures.
Consent 3/Introductory Paragraph: I gave the patient a chance to ask questions they had about the procedure.  Following this I explained the Mohs procedure and consent was obtained. The risks, benefits and alternatives to therapy were discussed in detail. Specifically, the risks of infection, scarring, bleeding, prolonged wound healing, incomplete removal, allergy to anesthesia, nerve injury and recurrence were addressed. Prior to the procedure, the treatment site was clearly identified and confirmed by the patient. All components of Universal Protocol/PAUSE Rule completed.
Purse String (Simple) Text: Given the location of the defect and the characteristics of the surrounding skin a purse string closure was deemed most appropriate.  Undermining was performed circumfirentially around the surgical defect.  A purse string suture was then placed and tightened.
Epidermal Closure: simple interrupted
Mercedes Flap Text: The defect edges were debeveled with a #15 scalpel blade.  Given the location of the defect, shape of the defect and the proximity to free margins a Mercedes flap was deemed most appropriate.  Using a sterile surgical marker, an appropriate advancement flap was drawn incorporating the defect and placing the expected incisions within the relaxed skin tension lines where possible. The area thus outlined was incised deep to adipose tissue with a #15 scalpel blade.  The skin margins were undermined to an appropriate distance in all directions utilizing iris scissors.
Mustarde Flap Text: The defect edges were debeveled with a #15 scalpel blade.  Given the size, depth and location of the defect and the proximity to free margins a Mustarde flap was deemed most appropriate.  Using a sterile surgical marker, an appropriate flap was drawn incorporating the defect. The area thus outlined was incised with a #15 scalpel blade.  The skin margins were undermined to an appropriate distance in all directions utilizing iris scissors.
Double M-Plasty Complex Repair Preamble Text (Leave Blank If You Do Not Want): Extensive wide undermining was performed.
Otolaryngologist Procedure Text (B): After obtaining clear surgical margins the patient was sent to otolaryngology for surgical repair.  The patient understands they will receive post-surgical care and follow-up from the referring physician's office.
Referred To Mid-Level For Closure Text (Leave Blank If You Do Not Want): After obtaining clear surgical margins the patient was sent to a mid-level provider for surgical repair.  The patient understands they will receive post-surgical care and follow-up from the mid-level provider.
M-Plasty Intermediate Repair Preamble Text (Leave Blank If You Do Not Want): Undermining was performed with blunt dissection.
Was The Patient On Physician Recommended Anticoagulation Therapy?: Please Select the Appropriate Response
Hemigard Retention Suture: 0-0 Nylon
Cheiloplasty (Complex) Text: A decision was made to reconstruct the defect with a  cheiloplasty.  The defect was undermined extensively.  Additional obicularis oris muscle was excised with a 15 blade scalpel.  The defect was converted into a full thickness wedge to facilite a better cosmetic result.  Small vessels were then tied off with 5-0 monocyrl. The obicularis oris, superficial fascia, adipose and dermis were then reapproximated.  After the deeper layers were approximated the epidermis was reapproximated with particular care given to realign the vermilion border.
Which Instrument Did You Use For Dermabrasion?: Wire Brush
Crescentic Advancement Flap Text: The defect edges were debeveled with a #15 scalpel blade.  Given the location of the defect and the proximity to free margins a crescentic advancement flap was deemed most appropriate.  Using a sterile surgical marker, the appropriate advancement flap was drawn incorporating the defect and placing the expected incisions within the relaxed skin tension lines where possible.    The area thus outlined was incised deep to adipose tissue with a #15 scalpel blade.  The skin margins were undermined to an appropriate distance in all directions utilizing iris scissors.
Graft Donor Site Dermal Sutures (Optional): 5-0 Polysorb
Cheek-To-Nose Interpolation Flap Text: A decision was made to reconstruct the defect utilizing an interpolation axial flap and a staged reconstruction.  A telfa template was made of the defect.  This telfa template was then used to outline the Cheek-To-Nose Interpolation flap.  The donor area for the pedicle flap was then injected with anesthesia.  The flap was excised through the skin and subcutaneous tissue down to the layer of the underlying musculature.  The interpolation flap was carefully excised within this deep plane to maintain its blood supply.  The edges of the donor site were undermined.   The donor site was closed in a primary fashion.  The pedicle was then rotated into position and sutured.  Once the tube was sutured into place, adequate blood supply was confirmed with blanching and refill.  The pedicle was then wrapped with xeroform gauze and dressed appropriately with a telfa and gauze bandage to ensure continued blood supply and protect the attached pedicle.
Home Suture Removal Text: Patient was provided instructions on removing sutures and will remove their sutures at home.  If they have any questions or difficulties they will call the office.
Wound Care (No Sutures): Petrolatum
Advancement-Rotation Flap Text: The defect edges were debeveled with a #15 scalpel blade.  Given the location of the defect, shape of the defect and the proximity to free margins an advancement-rotation flap was deemed most appropriate.  Using a sterile surgical marker, an appropriate flap was drawn incorporating the defect and placing the expected incisions within the relaxed skin tension lines where possible. The area thus outlined was incised deep to adipose tissue with a #15 scalpel blade.  The skin margins were undermined to an appropriate distance in all directions utilizing iris scissors.
Pain Refusal Text: I offered to prescribe pain medication but the patient refused to take this medication.
No Repair - Repaired With Adjacent Surgical Defect Text (Leave Blank If You Do Not Want): After obtaining clear surgical margins the defect was repaired concurrently with another surgical defect which was in close approximation.
Skin Substitute Text: The defect edges were debeveled with a #15 scalpel blade.  Given the location of the defect, shape of the defect and the proximity to free margins a skin substitute graft was deemed most appropriate.  The graft material was trimmed to fit the size of the defect. The graft was then placed in the primary defect and oriented appropriately.
Epidermal Closure Graft Donor Site (Optional): running
Suture Removal: 7 days
O-T Plasty Text: The defect edges were debeveled with a #15 scalpel blade.  Given the location of the defect, shape of the defect and the proximity to free margins an O-T plasty was deemed most appropriate.  Using a sterile surgical marker, an appropriate O-T plasty was drawn incorporating the defect and placing the expected incisions within the relaxed skin tension lines where possible.    The area thus outlined was incised deep to adipose tissue with a #15 scalpel blade.  The skin margins were undermined to an appropriate distance in all directions utilizing iris scissors.
Oculoplastic Surgeon Procedure Text (D): After obtaining clear surgical margins the patient was sent to oculoplastics for surgical repair.  The patient understands they will receive post-surgical care and follow-up from the referring physician's office.
Non-Graft Cartilage Fenestration Text: The cartilage was fenestrated with a 2mm punch biopsy to help facilitate healing.
Inflammation Suggestive Of Cancer Camouflage Histology Text: There was a dense lymphocytic infiltrate which prevented adequate histologic evaluation of adjacent structures.
Special Stains Stage 8 - Results: Base On Clearance Noted Above
Provider Procedure Text (C): After obtaining clear surgical margins the defect was repaired by another provider.
Perineural Invasion (For Histology - Be Specific If Possible): absent
Consent 2/Introductory Paragraph: Mohs surgery was explained to the patient and consent was obtained. The risks, benefits and alternatives to therapy were discussed in detail. Specifically, the risks of infection, scarring, bleeding, prolonged wound healing, incomplete removal, allergy to anesthesia, nerve injury and recurrence were addressed. Prior to the procedure, the treatment site was clearly identified and confirmed by the patient. All components of Universal Protocol/PAUSE Rule completed.
Wound Care: Vaseline
Estlander Flap (Lower To Upper Lip) Text: The defect of the lower lip was assessed and measured.  Given the location and size of the defect, an Estlander flap was deemed most appropriate.  Using a sterile surgical marker, an appropriate Estlander flap was measured and drawn on the upper lip. Local anesthesia was then infiltrated. A scalpel was then used to incise the lateral aspect of the flap, through skin, muscle and mucosa, leaving the flap pedicled medially.  The flap was then rotated and positioned to fill the lower lip defect.  The flap was then sutured into place with a three layer technique, closing the orbicularis oris muscle layer with subcutaneous buried sutures, followed by a mucosal layer and an epidermal layer.
Nasolabial Transposition Flap Text: The defect edges were debeveled with a #15 scalpel blade.  Given the size, depth and location of the defect and the proximity to free margins a nasolabial transposition flap was deemed most appropriate. Using a sterile surgical marker, an appropriate flap was drawn incorporating the defect. The area thus outlined was incised with a #15 scalpel blade. The skin margins were undermined to an appropriate distance in all directions utilizing iris scissors. Following this, the designed flap was carried into the primary defect and sutured into place.
Trilobed Flap Text: The defect edges were debeveled with a #15 scalpel blade.  Given the location of the defect and the proximity to free margins a trilobed flap was deemed most appropriate.  Using a sterile surgical marker, an appropriate trilobed flap drawn around the defect.    The area thus outlined was incised deep to adipose tissue with a #15 scalpel blade.  The skin margins were undermined to an appropriate distance in all directions utilizing iris scissors.
Rhombic Flap Text: The defect edges were debeveled with a #15 scalpel blade.  Given the location of the defect and the proximity to free margins a rhombic flap was deemed most appropriate.  Using a sterile surgical marker, an appropriate rhombic flap was drawn incorporating the defect.    The area thus outlined was incised deep to adipose tissue with a #15 scalpel blade.  The skin margins were undermined to an appropriate distance in all directions utilizing iris scissors.
Mastoid Interpolation Flap Text: A decision was made to reconstruct the defect utilizing an interpolation axial flap and a staged reconstruction.  A telfa template was made of the defect.  This telfa template was then used to outline the mastoid interpolation flap.  The donor area for the pedicle flap was then injected with anesthesia.  The flap was excised through the skin and subcutaneous tissue down to the layer of the underlying musculature.  The pedicle flap was carefully excised within this deep plane to maintain its blood supply.  The edges of the donor site were undermined.   The donor site was closed in a primary fashion.  The pedicle was then rotated into position and sutured.  Once the tube was sutured into place, adequate blood supply was confirmed with blanching and refill.  The pedicle was then wrapped with xeroform gauze and dressed appropriately with a telfa and gauze bandage to ensure continued blood supply and protect the attached pedicle.
No Residual Tumor Seen Histology Text: There were no malignant cells seen in the sections examined.
Adjacent Tissue Transfer Text: The defect edges were debeveled with a #15 scalpel blade.  Given the location of the defect and the proximity to free margins an adjacent tissue transfer was deemed most appropriate.  Using a sterile surgical marker, an appropriate flap was drawn incorporating the defect and placing the expected incisions within the relaxed skin tension lines where possible.    The area thus outlined was incised deep to adipose tissue with a #15 scalpel blade.  The skin margins were undermined to an appropriate distance in all directions utilizing iris scissors.
Surgical Defect Width In Cm (Optional): 0.8
Consent (Temporal Branch)/Introductory Paragraph: The rationale for Mohs was explained to the patient and consent was obtained. The risks, benefits and alternatives to therapy were discussed in detail. Specifically, the risks of damage to the temporal branch of the facial nerve, infection, scarring, bleeding, prolonged wound healing, incomplete removal, allergy to anesthesia, and recurrence were addressed. Prior to the procedure, the treatment site was clearly identified and confirmed by the patient. All components of Universal Protocol/PAUSE Rule completed.
Post-Care Instructions: I reviewed with the patient in detail post-care instructions. Patient is not to engage in any heavy lifting, exercise, or swimming for the next 14 days. Should the patient develop any fevers, chills, bleeding, severe pain patient will contact the office immediately.
Retention Suture Text: Retention sutures were placed to support the closure and prevent dehiscence.
Double O-Z Flap Text: The defect edges were debeveled with a #15 scalpel blade.  Given the location of the defect, shape of the defect and the proximity to free margins a Double O-Z flap was deemed most appropriate.  Using a sterile surgical marker, an appropriate transposition flap was drawn incorporating the defect and placing the expected incisions within the relaxed skin tension lines where possible. The area thus outlined was incised deep to adipose tissue with a #15 scalpel blade.  The skin margins were undermined to an appropriate distance in all directions utilizing iris scissors.
X Size Of Lesion In Cm (Optional): 0.4
O-Z Flap Text: The defect edges were debeveled with a #15 scalpel blade.  Given the location of the defect, shape of the defect and the proximity to free margins an O-Z flap was deemed most appropriate.  Using a sterile surgical marker, an appropriate transposition flap was drawn incorporating the defect and placing the expected incisions within the relaxed skin tension lines where possible. The area thus outlined was incised deep to adipose tissue with a #15 scalpel blade.  The skin margins were undermined to an appropriate distance in all directions utilizing iris scissors.
Transposition Flap Text: The defect edges were debeveled with a #15 scalpel blade.  Given the location of the defect and the proximity to free margins a transposition flap was deemed most appropriate.  Using a sterile surgical marker, an appropriate transposition flap was drawn incorporating the defect.    The area thus outlined was incised deep to adipose tissue with a #15 scalpel blade.  The skin margins were undermined to an appropriate distance in all directions utilizing iris scissors.
Burow's Graft Text: The defect edges were debeveled with a #15 scalpel blade.  Given the location of the defect, shape of the defect, the proximity to free margins and the presence of a standing cone deformity a Burow's skin graft was deemed most appropriate. The standing cone was removed and this tissue was then trimmed to the shape of the primary defect. The adipose tissue was also removed until only dermis and epidermis were left.  The skin margins of the secondary defect were undermined to an appropriate distance in all directions utilizing iris scissors.  The secondary defect was closed with interrupted buried subcutaneous sutures.  The skin edges were then re-apposed with running  sutures.  The skin graft was then placed in the primary defect and oriented appropriately.
Number Of Stages: 1
Graft Cartilage Fenestration Text: The cartilage was fenestrated with a 2mm punch biopsy to help facilitate graft survival and healing.
Mohs Case Number: [id number]
Modified Advancement Flap Text: The defect edges were debeveled with a #15 scalpel blade.  Given the location of the defect, shape of the defect and the proximity to free margins a modified advancement flap was deemed most appropriate.  Using a sterile surgical marker, an appropriate advancement flap was drawn incorporating the defect and placing the expected incisions within the relaxed skin tension lines where possible.    The area thus outlined was incised deep to adipose tissue with a #15 scalpel blade.  The skin margins were undermined to an appropriate distance in all directions utilizing iris scissors.
Mohs Rapid Report Verbiage: The area of clinically evident tumor was marked with skin marking ink and appropriately hatched.  The initial incision was made following the Mohs approach through the skin.  The specimen was taken to the lab, divided into the necessary number of pieces, chromacoded and processed according to the Mohs protocol.  This was repeated in successive stages until a tumor free defect was achieved.
Ftsg Text: The defect edges were debeveled with a #15 scalpel blade.  Given the location of the defect, shape of the defect and the proximity to free margins a full thickness skin graft was deemed most appropriate.  Using a sterile surgical marker, the primary defect shape was transferred to the donor site. The area thus outlined was incised deep to adipose tissue with a #15 scalpel blade.  The harvested graft was then trimmed of adipose tissue until only dermis and epidermis was left.  The skin margins of the secondary defect were undermined to an appropriate distance in all directions utilizing iris scissors.  The secondary defect was closed with interrupted buried subcutaneous sutures.  The skin edges were then re-apposed with running  sutures.  The skin graft was then placed in the primary defect and oriented appropriately.
Eyelid Full Thickness Repair - [id number]: The eyelid defect was full thickness which required a wedge repair of the eyelid. Special care was taken to ensure that the eyelid margin was realligned when placing sutures.
Graft Donor Site Epidermal Sutures (Optional): 5-0 Surgipro
Estimated Blood Loss (Cc): minimal
Alar Island Pedicle Flap Text: The defect edges were debeveled with a #15 scalpel blade.  Given the location of the defect, shape of the defect and the proximity to the alar rim an island pedicle advancement flap was deemed most appropriate.  Using a sterile surgical marker, an appropriate advancement flap was drawn incorporating the defect, outlining the appropriate donor tissue and placing the expected incisions within the nasal ala running parallel to the alar rim. The area thus outlined was incised with a #15 scalpel blade.  The skin margins were undermined minimally to an appropriate distance in all directions around the primary defect and laterally outward around the island pedicle utilizing iris scissors.  There was minimal undermining beneath the pedicle flap.
Intermediate Repair And Graft Additional Text (Will Appearing After The Standard Complex Repair Text): The intermediate repair was not sufficient to completely close the primary defect. The remaining additional defect was repaired with the graft mentioned below.
Cheek Interpolation Flap Text: A decision was made to reconstruct the defect utilizing an interpolation axial flap and a staged reconstruction.  A telfa template was made of the defect.  This telfa template was then used to outline the Cheek Interpolation flap.  The donor area for the pedicle flap was then injected with anesthesia.  The flap was excised through the skin and subcutaneous tissue down to the layer of the underlying musculature.  The interpolation flap was carefully excised within this deep plane to maintain its blood supply.  The edges of the donor site were undermined.   The donor site was closed in a primary fashion.  The pedicle was then rotated into position and sutured.  Once the tube was sutured into place, adequate blood supply was confirmed with blanching and refill.  The pedicle was then wrapped with xeroform gauze and dressed appropriately with a telfa and gauze bandage to ensure continued blood supply and protect the attached pedicle.
Advancement Flap (Double) Text: The defect edges were debeveled with a #15 scalpel blade.  Given the location of the defect and the proximity to free margins a double advancement flap was deemed most appropriate.  Using a sterile surgical marker, the appropriate advancement flaps were drawn incorporating the defect and placing the expected incisions within the relaxed skin tension lines where possible.    The area thus outlined was incised deep to adipose tissue with a #15 scalpel blade.  The skin margins were undermined to an appropriate distance in all directions utilizing iris scissors.
Star Wedge Flap Text: The defect edges were debeveled with a #15 scalpel blade.  Given the location of the defect, shape of the defect and the proximity to free margins a star wedge flap was deemed most appropriate.  Using a sterile surgical marker, an appropriate rotation flap was drawn incorporating the defect and placing the expected incisions within the relaxed skin tension lines where possible. The area thus outlined was incised deep to adipose tissue with a #15 scalpel blade.  The skin margins were undermined to an appropriate distance in all directions utilizing iris scissors.
Mohs Histo Method Verbiage: Each section was then chromacoded and processed in the Mohs lab using the Mohs protocol and submitted for frozen section.
Initial Size Of Lesion: 0.7
Asc Procedure Text (A): After obtaining clear surgical margins the patient was sent to an ASC for surgical repair.  The patient understands they will receive post-surgical care and follow-up from the ASC physician.
Banner Transposition Flap Text: The defect edges were debeveled with a #15 scalpel blade.  Given the location of the defect and the proximity to free margins a Banner transposition flap was deemed most appropriate.  Using a sterile surgical marker, an appropriate flap drawn around the defect. The area thus outlined was incised deep to adipose tissue with a #15 scalpel blade.  The skin margins were undermined to an appropriate distance in all directions utilizing iris scissors.
Where Do You Want The Question To Include Opioid Counseling Located?: Case Summary Tab
Surgeon: Natividad Bustamante MD
Consent (Spinal Accessory)/Introductory Paragraph: The rationale for Mohs was explained to the patient and consent was obtained. The risks, benefits and alternatives to therapy were discussed in detail. Specifically, the risks of damage to the spinal accessory nerve, infection, scarring, bleeding, prolonged wound healing, incomplete removal, allergy to anesthesia, and recurrence were addressed. Prior to the procedure, the treatment site was clearly identified and confirmed by the patient. All components of Universal Protocol/PAUSE Rule completed.
Tumor Depth: Less than 6mm from granular layer and no invasion beyond the subcutaneous fat
Additional Anesthesia Volume In Cc: 6
Bilobed Transposition Flap Text: The defect edges were debeveled with a #15 scalpel blade.  Given the location of the defect and the proximity to free margins a bilobed transposition flap was deemed most appropriate.  Using a sterile surgical marker, an appropriate bilobe flap drawn around the defect.    The area thus outlined was incised deep to adipose tissue with a #15 scalpel blade.  The skin margins were undermined to an appropriate distance in all directions utilizing iris scissors.
Tumor Debulked?: curette
Hemigard Postcare Instructions: The HEMIGARD strips are to remain completely dry for at least 5-7 days.
Muscle Hinge Flap Text: The defect edges were debeveled with a #15 scalpel blade.  Given the size, depth and location of the defect and the proximity to free margins a muscle hinge flap was deemed most appropriate.  Using a sterile surgical marker, an appropriate hinge flap was drawn incorporating the defect. The area thus outlined was incised with a #15 scalpel blade.  The skin margins were undermined to an appropriate distance in all directions utilizing iris scissors.
Advancement Flap (Single) Text: The defect edges were debeveled with a #15 scalpel blade.  Given the location of the defect and the proximity to free margins a single advancement flap was deemed most appropriate.  Using a sterile surgical marker, an appropriate advancement flap was drawn incorporating the defect and placing the expected incisions within the relaxed skin tension lines where possible.    The area thus outlined was incised deep to adipose tissue with a #15 scalpel blade.  The skin margins were undermined to an appropriate distance in all directions utilizing iris scissors.
Information: Selecting Yes will display possible errors in your note based on the variables you have selected. This validation is only offered as a suggestion for you. PLEASE NOTE THAT THE VALIDATION TEXT WILL BE REMOVED WHEN YOU FINALIZE YOUR NOTE. IF YOU WANT TO FAX A PRELIMINARY NOTE YOU WILL NEED TO TOGGLE THIS TO 'NO' IF YOU DO NOT WANT IT IN YOUR FAXED NOTE.
Helical Rim Text: The closure involved the helical rim.
Intermediate Repair And Flap Additional Text (Will Appearing After The Standard Complex Repair Text): The intermediate repair was not sufficient to completely close the primary defect. The remaining additional defect was repaired with the flap mentioned below.
Mucosal Advancement Flap Text: Given the location of the defect, shape of the defect and the proximity to free margins a mucosal advancement flap was deemed most appropriate. Incisions were made with a 15 blade scalpel in the appropriate fashion along the cutaneous vermilion border and the mucosal lip. The remaining actinically damaged mucosal tissue was excised.  The mucosal advancement flap was then elevated to the gingival sulcus with care taken to preserve the neurovascular structures and advanced into the primary defect. Care was taken to ensure that precise realignment of the vermilion border was achieved.
Purse String (Intermediate) Text: Given the location of the defect and the characteristics of the surrounding skin a purse string intermediate closure was deemed most appropriate.  Undermining was performed circumfirentially around the surgical defect.  A purse string suture was then placed and tightened.
Melolabial Interpolation Flap Text: A decision was made to reconstruct the defect utilizing an interpolation axial flap and a staged reconstruction.  A telfa template was made of the defect.  This telfa template was then used to outline the melolabial interpolation flap.  The donor area for the pedicle flap was then injected with anesthesia.  The flap was excised through the skin and subcutaneous tissue down to the layer of the underlying musculature.  The pedicle flap was carefully excised within this deep plane to maintain its blood supply.  The edges of the donor site were undermined.   The donor site was closed in a primary fashion.  The pedicle was then rotated into position and sutured.  Once the tube was sutured into place, adequate blood supply was confirmed with blanching and refill.  The pedicle was then wrapped with xeroform gauze and dressed appropriately with a telfa and gauze bandage to ensure continued blood supply and protect the attached pedicle.
V-Y Plasty Text: The defect edges were debeveled with a #15 scalpel blade.  Given the location of the defect, shape of the defect and the proximity to free margins an V-Y advancement flap was deemed most appropriate.  Using a sterile surgical marker, an appropriate advancement flap was drawn incorporating the defect and placing the expected incisions within the relaxed skin tension lines where possible.    The area thus outlined was incised deep to adipose tissue with a #15 scalpel blade.  The skin margins were undermined to an appropriate distance in all directions utilizing iris scissors.
Consent (Near Eyelid Margin)/Introductory Paragraph: The rationale for Mohs was explained to the patient and consent was obtained. The risks, benefits and alternatives to therapy were discussed in detail. Specifically, the risks of ectropion or eyelid deformity, infection, scarring, bleeding, prolonged wound healing, incomplete removal, allergy to anesthesia, nerve injury and recurrence were addressed. Prior to the procedure, the treatment site was clearly identified and confirmed by the patient. All components of Universal Protocol/PAUSE Rule completed.
Hatchet Flap Text: The defect edges were debeveled with a #15 scalpel blade.  Given the location of the defect, shape of the defect and the proximity to free margins a hatchet flap was deemed most appropriate.  Using a sterile surgical marker, an appropriate hatchet flap was drawn incorporating the defect and placing the expected incisions within the relaxed skin tension lines where possible.    The area thus outlined was incised deep to adipose tissue with a #15 scalpel blade.  The skin margins were undermined to an appropriate distance in all directions utilizing iris scissors.
Hemostasis: Electrocautery
Interpolation Flap Text: A decision was made to reconstruct the defect utilizing an interpolation axial flap and a staged reconstruction.  A telfa template was made of the defect.  This telfa template was then used to outline the interpolation flap.  The donor area for the pedicle flap was then injected with anesthesia.  The flap was excised through the skin and subcutaneous tissue down to the layer of the underlying musculature.  The interpolation flap was carefully excised within this deep plane to maintain its blood supply.  The edges of the donor site were undermined.   The donor site was closed in a primary fashion.  The pedicle was then rotated into position and sutured.  Once the tube was sutured into place, adequate blood supply was confirmed with blanching and refill.  The pedicle was then wrapped with xeroform gauze and dressed appropriately with a telfa and gauze bandage to ensure continued blood supply and protect the attached pedicle.
Staged Advancement Flap Text: The defect edges were debeveled with a #15 scalpel blade.  Given the location of the defect, shape of the defect and the proximity to free margins a staged advancement flap was deemed most appropriate.  Using a sterile surgical marker, an appropriate advancement flap was drawn incorporating the defect and placing the expected incisions within the relaxed skin tension lines where possible. The area thus outlined was incised deep to adipose tissue with a #15 scalpel blade.  The skin margins were undermined to an appropriate distance in all directions utilizing iris scissors.
Mart-1 - Negative Histology Text: MART-1 staining demonstrates a normal density and pattern of melanocytes along the dermal-epidermal junction. The surgical margins are negative for tumor cells.
Bilateral Helical Rim Advancement Flap Text: The defect edges were debeveled with a #15 blade scalpel.  Given the location of the defect and the proximity to free margins (helical rim) a bilateral helical rim advancement flap was deemed most appropriate.  Using a sterile surgical marker, the appropriate advancement flaps were drawn incorporating the defect and placing the expected incisions between the helical rim and antihelix where possible.  The area thus outlined was incised through and through with a #15 scalpel blade.  With a skin hook and iris scissors, the flaps were gently and sharply undermined and freed up.
Graft Donor Site Bandage (Optional-Leave Blank If You Don't Want In Note): Aquaplast was fitted to the graft site and sewn into place. A pressure bandage were applied to the donor site and over the aquaplast bolster.
Double Z Plasty Text: The lesion was extirpated to the level of the fat with a #15 scalpel blade. Given the location of the defect, shape of the defect and the proximity to free margins a double Z-plasty was deemed most appropriate for repair. Using a sterile surgical marker, the appropriate transposition arms of the double Z-plasty were drawn incorporating the defect and placing the expected incisions within the relaxed skin tension lines where possible. The area thus outlined was incised deep to adipose tissue with a #15 scalpel blade. The skin margins were undermined to an appropriate distance in all directions utilizing iris scissors. The opposing transposition arms were then transposed and carried over into place in opposite direction and anchored with interrupted buried subcutaneous sutures.
Orbicularis Oris Muscle Flap Text: The defect edges were debeveled with a #15 scalpel blade.  Given that the defect affected the competency of the oral sphincter an orbicularis oris muscle flap was deemed most appropriate to restore this competency and normal muscle function.  Using a sterile surgical marker, an appropriate flap was drawn incorporating the defect. The area thus outlined was incised with a #15 scalpel blade.
Localized Dermabrasion With 15 Blade Text: The patient was draped in routine manner.  Localized dermabrasion using a 15 blade was performed in routine manner to papillary dermis. This spot dermabrasion is being performed to complete skin cancer reconstruction. It also will eliminate the other sun damaged precancerous cells that are known to be part of the regional effect of a lifetime's worth of sun exposure. This localized dermabrasion is therapeutic and should not be considered cosmetic in any regard.
Epidermal Autograft Text: The defect edges were debeveled with a #15 scalpel blade.  Given the location of the defect, shape of the defect and the proximity to free margins an epidermal autograft was deemed most appropriate.  Using a sterile surgical marker, the primary defect shape was transferred to the donor site. The epidermal graft was then harvested.  The skin graft was then placed in the primary defect and oriented appropriately.
Epidermal Sutures: 5-0 Fast Absorbing Gut
Consent (Scalp)/Introductory Paragraph: The rationale for Mohs was explained to the patient and consent was obtained. The risks, benefits and alternatives to therapy were discussed in detail. Specifically, the risks of changes in hair growth pattern secondary to repair, infection, scarring, bleeding, prolonged wound healing, incomplete removal, allergy to anesthesia, nerve injury and recurrence were addressed. Prior to the procedure, the treatment site was clearly identified and confirmed by the patient. All components of Universal Protocol/PAUSE Rule completed.
Complex Repair And Graft Additional Text (Will Appearing After The Standard Complex Repair Text): The complex repair was not sufficient to completely close the primary defect. The remaining additional defect was repaired with the graft mentioned below.
Composite Graft Text: The defect edges were debeveled with a #15 scalpel blade.  Given the location of the defect, shape of the defect, the proximity to free margins and the fact the defect was full thickness a composite graft was deemed most appropriate.  The defect was outline and then transferred to the donor site.  A full thickness graft was then excised from the donor site. The graft was then placed in the primary defect, oriented appropriately and then sutured into place.  The secondary defect was then repaired using a primary closure.
Xenograft Text: The defect edges were debeveled with a #15 scalpel blade.  Given the location of the defect, shape of the defect and the proximity to free margins a xenograft was deemed most appropriate.  The graft was then trimmed to fit the size of the defect.  The graft was then placed in the primary defect and oriented appropriately.
Bilobed Flap Text: The defect edges were debeveled with a #15 scalpel blade.  Given the location of the defect and the proximity to free margins a bilobe flap was deemed most appropriate.  Using a sterile surgical marker, an appropriate bilobe flap drawn around the defect.    The area thus outlined was incised deep to adipose tissue with a #15 scalpel blade.  The skin margins were undermined to an appropriate distance in all directions utilizing iris scissors.
Island Pedicle Flap With Canthal Suspension Text: The defect edges were debeveled with a #15 scalpel blade.  Given the location of the defect, shape of the defect and the proximity to free margins an island pedicle advancement flap was deemed most appropriate.  Using a sterile surgical marker, an appropriate advancement flap was drawn incorporating the defect, outlining the appropriate donor tissue and placing the expected incisions within the relaxed skin tension lines where possible. The area thus outlined was incised deep to adipose tissue with a #15 scalpel blade.  The skin margins were undermined to an appropriate distance in all directions around the primary defect and laterally outward around the island pedicle utilizing iris scissors.  There was minimal undermining beneath the pedicle flap. A suspension suture was placed in the canthal tendon to prevent tension and prevent ectropion.
Bilateral Rotation Flap Text: The defect edges were debeveled with a #15 scalpel blade. Given the location of the defect, shape of the defect and the proximity to free margins a bilateral rotation flap was deemed most appropriate. Using a sterile surgical marker, an appropriate rotation flap was drawn incorporating the defect and placing the expected incisions within the relaxed skin tension lines where possible. The area thus outlined was incised deep to adipose tissue with a #15 scalpel blade. The skin margins were undermined to an appropriate distance in all directions utilizing iris scissors. Following this, the designed flap was carried over into the primary defect and sutured into place.
Nostril Rim Text: The closure involved the nostril rim.
Nasal Turnover Hinge Flap Text: The defect edges were debeveled with a #15 scalpel blade.  Given the size, depth, location of the defect and the defect being full thickness a nasal turnover hinge flap was deemed most appropriate.  Using a sterile surgical marker, an appropriate hinge flap was drawn incorporating the defect. The area thus outlined was incised with a #15 scalpel blade. The flap was designed to recreate the nasal mucosal lining and the alar rim. The skin margins were undermined to an appropriate distance in all directions utilizing iris scissors.
Consent (Marginal Mandibular)/Introductory Paragraph: The rationale for Mohs was explained to the patient and consent was obtained. The risks, benefits and alternatives to therapy were discussed in detail. Specifically, the risks of damage to the marginal mandibular branch of the facial nerve, infection, scarring, bleeding, prolonged wound healing, incomplete removal, allergy to anesthesia, and recurrence were addressed. Prior to the procedure, the treatment site was clearly identified and confirmed by the patient. All components of Universal Protocol/PAUSE Rule completed.
Closure 4 Information: This tab is for additional flaps and grafts above and beyond our usual structured repairs.  Please note if you enter information here it will not currently bill and you will need to add the billing information manually.
Which Eyelid Repair Cpt Are You Using?: 73425
Depth Of Tumor Invasion (For Histology): epidermis
Deep Sutures: 5-0 Monosof
Split-Thickness Skin Graft Text: The defect edges were debeveled with a #15 scalpel blade.  Given the location of the defect, shape of the defect and the proximity to free margins a split thickness skin graft was deemed most appropriate.  Using a sterile surgical marker, the primary defect shape was transferred to the donor site. The split thickness graft was then harvested.  The skin graft was then placed in the primary defect and oriented appropriately.
Consent (Ear)/Introductory Paragraph: The rationale for Mohs was explained to the patient and consent was obtained. The risks, benefits and alternatives to therapy were discussed in detail. Specifically, the risks of ear deformity, infection, scarring, bleeding, prolonged wound healing, incomplete removal, allergy to anesthesia, nerve injury and recurrence were addressed. Prior to the procedure, the treatment site was clearly identified and confirmed by the patient. All components of Universal Protocol/PAUSE Rule completed.
Repair Type: Intermediate Layered Repair
H Plasty Text: Given the location of the defect, shape of the defect and the proximity to free margins a H-plasty was deemed most appropriate for repair.  Using a sterile surgical marker, the appropriate advancement arms of the H-plasty were drawn incorporating the defect and placing the expected incisions within the relaxed skin tension lines where possible. The area thus outlined was incised deep to adipose tissue with a #15 scalpel blade. The skin margins were undermined to an appropriate distance in all directions utilizing iris scissors.  The opposing advancement arms were then advanced into place in opposite direction and anchored with interrupted buried subcutaneous sutures.
Cheiloplasty (Less Than 50%) Text: A decision was made to reconstruct the defect with a  cheiloplasty.  The defect was undermined extensively.  Additional obicularis oris muscle was excised with a 15 blade scalpel.  The defect was converted into a full thickness wedge, of less than 50% of the vertical height of the lip, to facilite a better cosmetic result.  Small vessels were then tied off with 5-0 monocyrl. The obicularis oris, superficial fascia, adipose and dermis were then reapproximated.  After the deeper layers were approximated the epidermis was reapproximated with particular care given to realign the vermilion border.
Localized Dermabrasion With Wire Brush Text: The patient was draped in routine manner.  Localized dermabrasion using 3 x 17 mm wire brush was performed in routine manner to papillary dermis. This spot dermabrasion is being performed to complete skin cancer reconstruction. It also will eliminate the other sun damaged precancerous cells that are known to be part of the regional effect of a lifetime's worth of sun exposure. This localized dermabrasion is therapeutic and should not be considered cosmetic in any regard.
Partial Purse String (Simple) Text: Given the location of the defect and the characteristics of the surrounding skin a simple purse string closure was deemed most appropriate.  Undermining was performed circumfirentially around the surgical defect.  A purse string suture was then placed and tightened. Wound tension only allowed a partial closure of the circular defect.
Unna Boot Text: An Unna boot was placed to help immobilize the limb and facilitate more rapid healing.
Abbe Flap (Upper To Lower Lip) Text: The defect of the lower lip was assessed and measured.  Given the location and size of the defect, an Abbe flap was deemed most appropriate.  Using a sterile surgical marker, an appropriate Abbe flap was measured and drawn on the upper lip. Local anesthesia was then infiltrated.  A scalpel was then used to incise the upper lip through and through the skin, vermilion, muscle and mucosa, leaving the flap pedicled on the opposite side.  The flap was then rotated and transferred to the lower lip defect.  The flap was then sutured into place with a three layer technique, closing the orbicularis oris muscle layer with subcutaneous buried sutures, followed by a mucosal layer and an epidermal layer.
Consent (Lip)/Introductory Paragraph: The rationale for Mohs was explained to the patient and consent was obtained. The risks, benefits and alternatives to therapy were discussed in detail. Specifically, the risks of lip deformity, changes in the oral aperture, infection, scarring, bleeding, prolonged wound healing, incomplete removal, allergy to anesthesia, nerve injury and recurrence were addressed. Prior to the procedure, the treatment site was clearly identified and confirmed by the patient. All components of Universal Protocol/PAUSE Rule completed.
Ear Star Wedge Flap Text: The defect edges were debeveled with a #15 blade scalpel.  Given the location of the defect and the proximity to free margins (helical rim) an ear star wedge flap was deemed most appropriate.  Using a sterile surgical marker, the appropriate flap was drawn incorporating the defect and placing the expected incisions between the helical rim and antihelix where possible.  The area thus outlined was incised through and through with a #15 scalpel blade.
Postop Diagnosis: same
Bill 59 Modifier?: No - Continue to Bill 79 Modifier
O-L Flap Text: The defect edges were debeveled with a #15 scalpel blade.  Given the location of the defect, shape of the defect and the proximity to free margins an O-L flap was deemed most appropriate.  Using a sterile surgical marker, an appropriate advancement flap was drawn incorporating the defect and placing the expected incisions within the relaxed skin tension lines where possible.    The area thus outlined was incised deep to adipose tissue with a #15 scalpel blade.  The skin margins were undermined to an appropriate distance in all directions utilizing iris scissors.
Rectangular Flap Text: The defect edges were debeveled with a #15 scalpel blade. Given the location of the defect and the proximity to free margins a rectangular flap was deemed most appropriate. Using a sterile surgical marker, an appropriate rectangular flap was drawn incorporating the defect. The area thus outlined was incised deep to adipose tissue with a #15 scalpel blade. The skin margins were undermined to an appropriate distance in all directions utilizing iris scissors. Following this, the designed flap was carried over into the primary defect and sutured into place.
Wound Check: 6 weeks
Secondary Intention Text (Leave Blank If You Do Not Want): The defect will heal with secondary intention.
Consent Type: Consent 1 (Standard)
Abbe Flap (Lower To Upper Lip) Text: The defect of the upper lip was assessed and measured.  Given the location and size of the defect, an Abbe flap was deemed most appropriate.  Using a sterile surgical marker, an appropriate Abbe flap was measured and drawn on the lower lip. Local anesthesia was then infiltrated. A scalpel was then used to incise the upper lip through and through the skin, vermilion, muscle and mucosa, leaving the flap pedicled on the opposite side.  The flap was then rotated and transferred to the lower lip defect.  The flap was then sutured into place with a three layer technique, closing the orbicularis oris muscle layer with subcutaneous buried sutures, followed by a mucosal layer and an epidermal layer.
V-Y Flap Text: The defect edges were debeveled with a #15 scalpel blade.  Given the location of the defect, shape of the defect and the proximity to free margins a V-Y flap was deemed most appropriate.  Using a sterile surgical marker, an appropriate advancement flap was drawn incorporating the defect and placing the expected incisions within the relaxed skin tension lines where possible.    The area thus outlined was incised deep to adipose tissue with a #15 scalpel blade.  The skin margins were undermined to an appropriate distance in all directions utilizing iris scissors.
Vermilion Border Text: The closure involved the vermilion border.
Localized Dermabrasion With Sand Papertext: The patient was draped in routine manner.  Localized dermabrasion using sterile sand paper was performed in routine manner to papillary dermis. This spot dermabrasion is being performed to complete skin cancer reconstruction. It also will eliminate the other sun damaged precancerous cells that are known to be part of the regional effect of a lifetime's worth of sun exposure. This localized dermabrasion is therapeutic and should not be considered cosmetic in any regard.
Dermal Autograft Text: The defect edges were debeveled with a #15 scalpel blade.  Given the location of the defect, shape of the defect and the proximity to free margins a dermal autograft was deemed most appropriate.  Using a sterile surgical marker, the primary defect shape was transferred to the donor site. The area thus outlined was incised deep to adipose tissue with a #15 scalpel blade.  The harvested graft was then trimmed of adipose and epidermal tissue until only dermis was left.  The skin graft was then placed in the primary defect and oriented appropriately.
Tarsorrhaphy Text: A tarsorrhaphy was performed using Frost sutures.
Area M Indication Text: Tumors in this location are included in Area M (cheek, forehead, scalp, neck, jawline and pretibial skin).  Mohs surgery is indicated for tumors in these anatomic locations.
Manual Repair Warning Statement: We plan on removing the manually selected variable below in favor of our much easier automatic structured text blocks found in the previous tab. We decided to do this to help make the flow better and give you the full power of structured data. Manual selection is never going to be ideal in our platform and I would encourage you to avoid using manual selection from this point on, especially since I will be sunsetting this feature. It is important that you do one of two things with the customized text below. First, you can save all of the text in a word file so you can have it for future reference. Second, transfer the text to the appropriate area in the Library tab. Lastly, if there is a flap or graft type which we do not have you need to let us know right away so I can add it in before the variable is hidden. No need to panic, we plan to give you roughly 6 months to make the change.
Posterior Auricular Interpolation Flap Text: A decision was made to reconstruct the defect utilizing an interpolation axial flap and a staged reconstruction.  A telfa template was made of the defect.  This telfa template was then used to outline the posterior auricular interpolation flap.  The donor area for the pedicle flap was then injected with anesthesia.  The flap was excised through the skin and subcutaneous tissue down to the layer of the underlying musculature.  The pedicle flap was carefully excised within this deep plane to maintain its blood supply.  The edges of the donor site were undermined.   The donor site was closed in a primary fashion.  The pedicle was then rotated into position and sutured.  Once the tube was sutured into place, adequate blood supply was confirmed with blanching and refill.  The pedicle was then wrapped with xeroform gauze and dressed appropriately with a telfa and gauze bandage to ensure continued blood supply and protect the attached pedicle.
Mart-1 - Positive Histology Text: MART-1 staining demonstrates areas of higher density and clustering of melanocytes with Pagetoid spread upwards within the epidermis. The surgical margins are positive for tumor cells.
Mauc Instructions: By selecting yes to the question below the MAUC number will be added into the note.  This will be calculated automatically based on the diagnosis chosen, the size entered, the body zone selected (H,M,L) and the specific indications you chose. You will also have the option to override the Mohs AUC if you disagree with the automatically calculated number and this option is found in the Case Summary tab.
Dressing (No Sutures): pressure dressing with telfa
Partial Purse String (Intermediate) Text: Given the location of the defect and the characteristics of the surrounding skin an intermediate purse string closure was deemed most appropriate.  Undermining was performed circumfirentially around the surgical defect.  A purse string suture was then placed and tightened. Wound tension only allowed a partial closure of the circular defect.
Rotation Flap Text: The defect edges were debeveled with a #15 scalpel blade.  Given the location of the defect, shape of the defect and the proximity to free margins a rotation flap was deemed most appropriate.  Using a sterile surgical marker, an appropriate rotation flap was drawn incorporating the defect and placing the expected incisions within the relaxed skin tension lines where possible.    The area thus outlined was incised deep to adipose tissue with a #15 scalpel blade.  The skin margins were undermined to an appropriate distance in all directions utilizing iris scissors.
Spiral Flap Text: The defect edges were debeveled with a #15 scalpel blade.  Given the location of the defect, shape of the defect and the proximity to free margins a spiral flap was deemed most appropriate.  Using a sterile surgical marker, an appropriate rotation flap was drawn incorporating the defect and placing the expected incisions within the relaxed skin tension lines where possible. The area thus outlined was incised deep to adipose tissue with a #15 scalpel blade.  The skin margins were undermined to an appropriate distance in all directions utilizing iris scissors.
Complex Repair And Flap Additional Text (Will Appearing After The Standard Complex Repair Text): The complex repair was not sufficient to completely close the primary defect. The remaining additional defect was repaired with the flap mentioned below.
Cartilage Graft Text: The defect edges were debeveled with a #15 scalpel blade.  Given the location of the defect, shape of the defect, the fact the defect involved a full thickness cartilage defect a cartilage graft was deemed most appropriate.  An appropriate donor site was identified, cleansed, and anesthetized. The cartilage graft was then harvested and transferred to the recipient site, oriented appropriately and then sutured into place.  The secondary defect was then repaired using a primary closure.
Bi-Rhombic Flap Text: The defect edges were debeveled with a #15 scalpel blade.  Given the location of the defect and the proximity to free margins a bi-rhombic flap was deemed most appropriate.  Using a sterile surgical marker, an appropriate rhombic flap was drawn incorporating the defect. The area thus outlined was incised deep to adipose tissue with a #15 scalpel blade.  The skin margins were undermined to an appropriate distance in all directions utilizing iris scissors.
Medical Necessity Statement: Based on my medical judgement, Mohs surgery is the most appropriate treatment for this cancer compared to other treatments.
Location Indication Override (Is Already Calculated Based On Selected Body Location): Area H
A-T Advancement Flap Text: The defect edges were debeveled with a #15 scalpel blade.  Given the location of the defect, shape of the defect and the proximity to free margins an A-T advancement flap was deemed most appropriate.  Using a sterile surgical marker, an appropriate advancement flap was drawn incorporating the defect and placing the expected incisions within the relaxed skin tension lines where possible.    The area thus outlined was incised deep to adipose tissue with a #15 scalpel blade.  The skin margins were undermined to an appropriate distance in all directions utilizing iris scissors.
Z Plasty Text: The lesion was extirpated to the level of the fat with a #15 scalpel blade.  Given the location of the defect, shape of the defect and the proximity to free margins a Z-plasty was deemed most appropriate for repair.  Using a sterile surgical marker, the appropriate transposition arms of the Z-plasty were drawn incorporating the defect and placing the expected incisions within the relaxed skin tension lines where possible.    The area thus outlined was incised deep to adipose tissue with a #15 scalpel blade.  The skin margins were undermined to an appropriate distance in all directions utilizing iris scissors.  The opposing transposition arms were then transposed into place in opposite direction and anchored with interrupted buried subcutaneous sutures.
Area H Indication Text: Tumors in this location are included in Area H (eyelids, eyebrows, nose, lips, chin, ear, pre-auricular, post-auricular, temple, genitalia, hands, feet, ankles and areola).  Tissue conservation is critical in these anatomic locations.
Area L Indication Text: Tumors in this location are included in Area L (trunk and extremities).  Mohs surgery is indicated for larger tumors, or tumors with aggressive histologic features, in these anatomic locations.
Bcc Histology Text: There were numerous aggregates of basaloid cells.
Anesthesia Volume In Cc: 3
Staging Info: By selecting yes to the question above you will include information on AJCC 8 tumor staging in your Mohs note. Information on tumor staging will be automatically added for SCCs on the head and neck. AJCC 8 includes tumor size, tumor depth, perineural involvement and bone invasion.
Double O-Z Plasty Text: The defect edges were debeveled with a #15 scalpel blade.  Given the location of the defect, shape of the defect and the proximity to free margins a Double O-Z plasty (double transposition flap) was deemed most appropriate.  Using a sterile surgical marker, the appropriate transposition flaps were drawn incorporating the defect and placing the expected incisions within the relaxed skin tension lines where possible. The area thus outlined was incised deep to adipose tissue with a #15 scalpel blade.  The skin margins were undermined to an appropriate distance in all directions utilizing iris scissors.  Hemostasis was achieved with electrocautery.  The flaps were then transposed into place, one clockwise and the other counterclockwise, and anchored with interrupted buried subcutaneous sutures.
Detail Level: Detailed
O-T Advancement Flap Text: The defect edges were debeveled with a #15 scalpel blade.  Given the location of the defect, shape of the defect and the proximity to free margins an O-T advancement flap was deemed most appropriate.  Using a sterile surgical marker, an appropriate advancement flap was drawn incorporating the defect and placing the expected incisions within the relaxed skin tension lines where possible.    The area thus outlined was incised deep to adipose tissue with a #15 scalpel blade.  The skin margins were undermined to an appropriate distance in all directions utilizing iris scissors.
Same Histology In Subsequent Stages Text: The pattern and morphology of the tumor is as described in the first stage.
Undermining Location (Optional): in the superficial subcutaneous fat
Suturegard Intro: Intraoperative tissue expansion was performed, utilizing the SUTUREGARD device, in order to reduce wound tension.
Surgical Defect Length In Cm (Optional): 1.0
Helical Rim Advancement Flap Text: The defect edges were debeveled with a #15 blade scalpel.  Given the location of the defect and the proximity to free margins (helical rim) a double helical rim advancement flap was deemed most appropriate.  Using a sterile surgical marker, the appropriate advancement flaps were drawn incorporating the defect and placing the expected incisions between the helical rim and antihelix where possible.  The area thus outlined was incised through and through with a #15 scalpel blade.  With a skin hook and iris scissors, the flaps were gently and sharply undermined and freed up.
Double Island Pedicle Flap Text: The defect edges were debeveled with a #15 scalpel blade.  Given the location of the defect, shape of the defect and the proximity to free margins a double island pedicle advancement flap was deemed most appropriate.  Using a sterile surgical marker, an appropriate advancement flap was drawn incorporating the defect, outlining the appropriate donor tissue and placing the expected incisions within the relaxed skin tension lines where possible.    The area thus outlined was incised deep to adipose tissue with a #15 scalpel blade.  The skin margins were undermined to an appropriate distance in all directions around the primary defect and laterally outward around the island pedicle utilizing iris scissors.  There was minimal undermining beneath the pedicle flap.
Flip-Flop Flap Text: The defect edges were debeveled with a #15 blade scalpel.  Given the location of the defect and the proximity to free margins a flip-flop flap was deemed most appropriate. Using a sterile surgical marker, the appropriate flap was drawn incorporating the defect and placing the expected incisions between the helical rim and antihelix where possible.  The area thus outlined was incised through and through with a #15 scalpel blade. Following this, the designed flap was carried over into the primary defect and sutured into place.
Brow Lift Text: A midfrontal incision was made medially to the defect to allow access to the tissues just superior to the left eyebrow. Following careful dissection inferiorly in a supraperiosteal plane to the level of the left eyebrow, several 3-0 monocryl sutures were used to resuspend the eyebrow orbicularis oculi muscular unit to the superior frontal bone periosteum. This resulted in an appropriate reapproximation of static eyebrow symmetry and correction of the left brow ptosis.
O-Z Plasty Text: The defect edges were debeveled with a #15 scalpel blade.  Given the location of the defect, shape of the defect and the proximity to free margins an O-Z plasty (double transposition flap) was deemed most appropriate.  Using a sterile surgical marker, the appropriate transposition flaps were drawn incorporating the defect and placing the expected incisions within the relaxed skin tension lines where possible.    The area thus outlined was incised deep to adipose tissue with a #15 scalpel blade.  The skin margins were undermined to an appropriate distance in all directions utilizing iris scissors.  Hemostasis was achieved with electrocautery.  The flaps were then transposed into place, one clockwise and the other counterclockwise, and anchored with interrupted buried subcutaneous sutures.
Subsequent Stages Histo Method Verbiage: Using a similar technique to that described above, a thin layer of tissue was removed from all areas where tumor was visible on the previous stage.  The tissue was again oriented, mapped, dyed, and processed as above.
Keystone Flap Text: The defect edges were debeveled with a #15 scalpel blade.  Given the location of the defect, shape of the defect a keystone flap was deemed most appropriate.  Using a sterile surgical marker, an appropriate keystone flap was drawn incorporating the defect, outlining the appropriate donor tissue and placing the expected incisions within the relaxed skin tension lines where possible. The area thus outlined was incised deep to adipose tissue with a #15 scalpel blade.  The skin margins were undermined to an appropriate distance in all directions around the primary defect and laterally outward around the flap utilizing iris scissors.
Eyelid Partial Thickness Repair - 62902: The eyelid defect was partial thickness which required a wedge repair of the eyelid. Special care was taken to ensure that the eyelid margin was realligned when placing sutures.
Suturegard Body: The suture ends were repeatedly re-tightened and re-clamped to achieve the desired tissue expansion.
Island Pedicle Flap-Requiring Vessel Identification Text: The defect edges were debeveled with a #15 scalpel blade.  Given the location of the defect, shape of the defect and the proximity to free margins an island pedicle advancement flap was deemed most appropriate.  Using a sterile surgical marker, an appropriate advancement flap was drawn, based on the axial vessel mentioned above, incorporating the defect, outlining the appropriate donor tissue and placing the expected incisions within the relaxed skin tension lines where possible.    The area thus outlined was incised deep to adipose tissue with a #15 scalpel blade.  The skin margins were undermined to an appropriate distance in all directions around the primary defect and laterally outward around the island pedicle utilizing iris scissors.  There was minimal undermining beneath the pedicle flap.
Mohs Method Verbiage: An incision at a 45 degree angle following the standard Mohs approach was done and the specimen was harvested as a microscopic controlled layer.
Full Thickness Lip Wedge Repair (Flap) Text: Given the location of the defect and the proximity to free margins a full thickness wedge repair was deemed most appropriate.  Using a sterile surgical marker, the appropriate repair was drawn incorporating the defect and placing the expected incisions perpendicular to the vermilion border.  The vermilion border was also meticulously outlined to ensure appropriate reapproximation during the repair.  The area thus outlined was incised through and through with a #15 scalpel blade.  The muscularis and dermis were reaproximated with deep sutures following hemostasis. Care was taken to realign the vermilion border before proceeding with the superficial closure.  Once the vermilion was realigned the superfical and mucosal closure was finished.
Simple / Intermediate / Complex Repair - Final Wound Length In Cm: 2.2
Undermining Type: Entire Wound
Ear Wedge Repair Text: A wedge excision was completed by carrying down an excision through the full thickness of the ear and cartilage with an inward facing Burow's triangle. The wound was then closed in a layered fashion.
Consent 1/Introductory Paragraph: The rationale for Mohs was explained to the patient and consent was obtained. The risks, benefits and alternatives to therapy were discussed in detail. Specifically, the risks of infection, scarring, bleeding, prolonged wound healing, incomplete removal, allergy to anesthesia, nerve injury and recurrence were addressed. Prior to the procedure, the treatment site was clearly identified and confirmed by the patient. All components of Universal Protocol/PAUSE Rule completed.
Retention Suture Bite Size: 1 mm
Eye Protection Verbiage: Before proceeding with the stage, a plastic scleral shield was inserted. The globe was anesthetized with a few drops of 1% lidocaine with 1:100,000 epinephrine. Then, an appropriate sized scleral shield was chosen and coated with lacrilube ointment. The shield was gently inserted and left in place for the duration of each stage. After the stage was completed, the shield was gently removed.
Chonodrocutaneous Helical Advancement Flap Text: The defect edges were debeveled with a #15 scalpel blade.  Given the location of the defect and the proximity to free margins a chondrocutaneous helical advancement flap was deemed most appropriate.  Using a sterile surgical marker, the appropriate advancement flap was drawn incorporating the defect and placing the expected incisions within the relaxed skin tension lines where possible.    The area thus outlined was incised deep to adipose tissue with a #15 scalpel blade.  The skin margins were undermined to an appropriate distance in all directions utilizing iris scissors.
Debridement Text: The wound edges were debrided prior to proceeding with the closure to facilitate wound healing.
Pinch Graft Text: The defect edges were debeveled with a #15 scalpel blade. Given the location of the defect, shape of the defect and the proximity to free margins a pinch graft was deemed most appropriate. Using a sterile surgical marker, the primary defect shape was transferred to the donor site. The area thus outlined was incised deep to adipose tissue with a #15 scalpel blade.  The harvested graft was then trimmed of adipose tissue until only dermis and epidermis was left. The skin margins of the secondary defect were undermined to an appropriate distance in all directions utilizing iris scissors.  The secondary defect was closed with interrupted buried subcutaneous sutures.  The skin edges were then re-apposed with running  sutures.  The skin graft was then placed in the primary defect and oriented appropriately.
Hemigard Intro: Due to skin fragility and wound tension, it was decided to use HEMIGARD adhesive retention suture devices to permit a linear closure. The skin was cleaned and dried for a 6cm distance away from the wound. Excessive hair, if present, was removed to allow for adhesion.
Burow's Advancement Flap Text: The defect edges were debeveled with a #15 scalpel blade.  Given the location of the defect and the proximity to free margins a Burow's advancement flap was deemed most appropriate.  Using a sterile surgical marker, the appropriate advancement flap was drawn incorporating the defect and placing the expected incisions within the relaxed skin tension lines where possible.    The area thus outlined was incised deep to adipose tissue with a #15 scalpel blade.  The skin margins were undermined to an appropriate distance in all directions utilizing iris scissors.
Rhomboid Transposition Flap Text: The defect edges were debeveled with a #15 scalpel blade.  Given the location of the defect and the proximity to free margins a rhomboid transposition flap was deemed most appropriate.  Using a sterile surgical marker, an appropriate rhomboid flap was drawn incorporating the defect.    The area thus outlined was incised deep to adipose tissue with a #15 scalpel blade.  The skin margins were undermined to an appropriate distance in all directions utilizing iris scissors.
W Plasty Text: The lesion was extirpated to the level of the fat with a #15 scalpel blade.  Given the location of the defect, shape of the defect and the proximity to free margins a W-plasty was deemed most appropriate for repair.  Using a sterile surgical marker, the appropriate transposition arms of the W-plasty were drawn incorporating the defect and placing the expected incisions within the relaxed skin tension lines where possible.    The area thus outlined was incised deep to adipose tissue with a #15 scalpel blade.  The skin margins were undermined to an appropriate distance in all directions utilizing iris scissors.  The opposing transposition arms were then transposed into place in opposite direction and anchored with interrupted buried subcutaneous sutures.
Nasalis Myocutaneous Flap Text: Using a #15 blade, an incision was made around the donor flap to the level of the nasalis muscle. Wide lateral undermining was then performed in both the subcutaneous plane above the nasalis muscle, and in a submuscular plane just above periosteum. This allowed the formation of a free nasalis muscle axial pedicle which was still attached to the actual cutaneous flap, increasing its mobility and vascular viability. Hemostasis was obtained with pinpoint electrocoagulation. The flap was mobilized into position and the pivotal anchor points positioned and stabilized with buried interrupted sutures. Subcutaneous and dermal tissues were closed in a multilayered fashion with sutures. Tissue redundancies were excised, and the epidermal edges were apposed without significant tension and sutured with sutures.
Repair Hemostasis (Optional): Pinpoint electrocautery
Melolabial Transposition Flap Text: The defect edges were debeveled with a #15 scalpel blade.  Given the location of the defect and the proximity to free margins a melolabial flap was deemed most appropriate.  Using a sterile surgical marker, an appropriate melolabial transposition flap was drawn incorporating the defect.    The area thus outlined was incised deep to adipose tissue with a #15 scalpel blade.  The skin margins were undermined to an appropriate distance in all directions utilizing iris scissors.
Tissue Cultured Epidermal Autograft Text: The defect edges were debeveled with a #15 scalpel blade.  Given the location of the defect, shape of the defect and the proximity to free margins a tissue cultured epidermal autograft was deemed most appropriate.  The graft was then trimmed to fit the size of the defect.  The graft was then placed in the primary defect and oriented appropriately.
Consent (Nose)/Introductory Paragraph: The rationale for Mohs was explained to the patient and consent was obtained. The risks, benefits and alternatives to therapy were discussed in detail. Specifically, the risks of nasal deformity, changes in the flow of air through the nose, infection, scarring, bleeding, prolonged wound healing, incomplete removal, allergy to anesthesia, nerve injury and recurrence were addressed. Prior to the procedure, the treatment site was clearly identified and confirmed by the patient. All components of Universal Protocol/PAUSE Rule completed.
Surgeon/Pathologist Verbiage (Will Incorporate Name Of Surgeon From Intro If Not Blank): operated in two distinct and integrated capacities as the surgeon and pathologist.
Island Pedicle Flap Text: The defect edges were debeveled with a #15 scalpel blade.  Given the location of the defect, shape of the defect and the proximity to free margins an island pedicle advancement flap was deemed most appropriate.  Using a sterile surgical marker, an appropriate advancement flap was drawn incorporating the defect, outlining the appropriate donor tissue and placing the expected incisions within the relaxed skin tension lines where possible.    The area thus outlined was incised deep to adipose tissue with a #15 scalpel blade.  The skin margins were undermined to an appropriate distance in all directions around the primary defect and laterally outward around the island pedicle utilizing iris scissors.  There was minimal undermining beneath the pedicle flap.
Peng Advancement Flap Text: The defect edges were debeveled with a #15 scalpel blade.  Given the location of the defect, shape of the defect and the proximity to free margins a Peng advancement flap was deemed most appropriate.  Using a sterile surgical marker, an appropriate advancement flap was drawn incorporating the defect and placing the expected incisions within the relaxed skin tension lines where possible. The area thus outlined was incised deep to adipose tissue with a #15 scalpel blade.  The skin margins were undermined to an appropriate distance in all directions utilizing iris scissors.
Donor Site Anesthesia Type: same as repair anesthesia
Closure 2 Information: This tab is for additional flaps and grafts, including complex repair and grafts and complex repair and flaps. You can also specify a different location for the additional defect, if the location is the same you do not need to select a new one. We will insert the automated text for the repair you select below just as we do for solitary flaps and grafts. Please note that at this time if you select a location with a different insurance zone you will need to override the ICD10 and CPT if appropriate.
Bcc Infiltrative Histology Text: There were numerous aggregates of basaloid cells demonstrating an infiltrative pattern.
Dorsal Nasal Flap Text: The defect edges were debeveled with a #15 scalpel blade.  Given the location of the defect and the proximity to free margins a dorsal nasal flap was deemed most appropriate.  Using a sterile surgical marker, an appropriate dorsal nasal flap was drawn around the defect.    The area thus outlined was incised deep to adipose tissue with a #15 scalpel blade.  The skin margins were undermined to an appropriate distance in all directions utilizing iris scissors.
Paramedian Forehead Flap Text: A decision was made to reconstruct the defect utilizing an interpolation axial flap and a staged reconstruction.  A telfa template was made of the defect.  This telfa template was then used to outline the paramedian forehead pedicle flap.  The donor area for the pedicle flap was then injected with anesthesia.  The flap was excised through the skin and subcutaneous tissue down to the layer of the underlying musculature.  The pedicle flap was carefully excised within this deep plane to maintain its blood supply.  The edges of the donor site were undermined.   The donor site was closed in a primary fashion.  The pedicle was then rotated into position and sutured.  Once the tube was sutured into place, adequate blood supply was confirmed with blanching and refill.  The pedicle was then wrapped with xeroform gauze and dressed appropriately with a telfa and gauze bandage to ensure continued blood supply and protect the attached pedicle.

## 2024-12-12 ENCOUNTER — APPOINTMENT (OUTPATIENT)
Dept: URBAN - METROPOLITAN AREA CLINIC 36 | Facility: CLINIC | Age: 69
Setting detail: DERMATOLOGY
End: 2024-12-12

## 2024-12-12 DIAGNOSIS — Z48.02 ENCOUNTER FOR REMOVAL OF SUTURES: ICD-10-CM

## 2024-12-12 PROCEDURE — ? SUTURE REMOVAL (GLOBAL PERIOD)

## 2024-12-12 ASSESSMENT — LOCATION SIMPLE DESCRIPTION DERM: LOCATION SIMPLE: LEFT EAR

## 2024-12-12 ASSESSMENT — LOCATION DETAILED DESCRIPTION DERM: LOCATION DETAILED: LEFT SUPERIOR HELIX

## 2024-12-12 ASSESSMENT — LOCATION ZONE DERM: LOCATION ZONE: EAR

## 2024-12-12 NOTE — PROCEDURE: SUTURE REMOVAL (GLOBAL PERIOD)
Detail Level: Detailed
Add 78754 Cpt? (Important Note: In 2017 The Use Of 78061 Is Being Tracked By Cms To Determine Future Global Period Reimbursement For Global Periods): no

## 2025-07-31 ENCOUNTER — APPOINTMENT (OUTPATIENT)
Dept: URBAN - METROPOLITAN AREA CLINIC 4 | Facility: CLINIC | Age: 70
Setting detail: DERMATOLOGY
End: 2025-07-31

## 2025-07-31 DIAGNOSIS — Z86.007 PERSONAL HISTORY OF IN-SITU NEOPLASM OF SKIN: ICD-10-CM

## 2025-07-31 DIAGNOSIS — D22 MELANOCYTIC NEVI: ICD-10-CM

## 2025-07-31 DIAGNOSIS — L82.1 OTHER SEBORRHEIC KERATOSIS: ICD-10-CM

## 2025-07-31 DIAGNOSIS — L57.0 ACTINIC KERATOSIS: ICD-10-CM

## 2025-07-31 PROBLEM — D22.39 MELANOCYTIC NEVI OF OTHER PARTS OF FACE: Status: ACTIVE | Noted: 2025-07-31

## 2025-07-31 PROBLEM — D48.5 NEOPLASM OF UNCERTAIN BEHAVIOR OF SKIN: Status: ACTIVE | Noted: 2025-07-31

## 2025-07-31 PROCEDURE — ? PRESCRIPTION

## 2025-07-31 PROCEDURE — ? LIQUID NITROGEN

## 2025-07-31 PROCEDURE — ? BIOPSY BY SHAVE METHOD

## 2025-07-31 PROCEDURE — ? COUNSELING

## 2025-07-31 RX ORDER — FLUOROURACIL 2 G/40G
1 CREAM TOPICAL BID
Qty: 40 | Refills: 0 | Status: ERX | COMMUNITY
Start: 2025-07-31

## 2025-07-31 RX ADMIN — FLUOROURACIL 1: 2 CREAM TOPICAL at 00:00

## 2025-07-31 ASSESSMENT — LOCATION SIMPLE DESCRIPTION DERM
LOCATION SIMPLE: RIGHT FOREARM
LOCATION SIMPLE: LEFT FOREHEAD
LOCATION SIMPLE: LEFT FOREARM
LOCATION SIMPLE: RIGHT NOSE
LOCATION SIMPLE: LEFT TEMPLE
LOCATION SIMPLE: LEFT EAR
LOCATION SIMPLE: RIGHT FOREHEAD
LOCATION SIMPLE: LEFT CHEEK
LOCATION SIMPLE: RIGHT TEMPLE

## 2025-07-31 ASSESSMENT — LOCATION DETAILED DESCRIPTION DERM
LOCATION DETAILED: LEFT LATERAL MALAR CHEEK
LOCATION DETAILED: LEFT DISTAL ULNAR DORSAL FOREARM
LOCATION DETAILED: RIGHT PROXIMAL DORSAL FOREARM
LOCATION DETAILED: LEFT SUPERIOR HELIX
LOCATION DETAILED: RIGHT DISTAL DORSAL FOREARM
LOCATION DETAILED: RIGHT SUPERIOR FOREHEAD
LOCATION DETAILED: RIGHT PROXIMAL RADIAL DORSAL FOREARM
LOCATION DETAILED: RIGHT NASAL SIDEWALL
LOCATION DETAILED: LEFT SUPERIOR TEMPLE
LOCATION DETAILED: RIGHT SUPERIOR TEMPLE
LOCATION DETAILED: LEFT SUPERIOR LATERAL FOREHEAD

## 2025-07-31 ASSESSMENT — LOCATION ZONE DERM
LOCATION ZONE: EAR
LOCATION ZONE: NOSE
LOCATION ZONE: FACE
LOCATION ZONE: ARM